# Patient Record
Sex: FEMALE | Race: OTHER | NOT HISPANIC OR LATINO | Employment: FULL TIME | URBAN - METROPOLITAN AREA
[De-identification: names, ages, dates, MRNs, and addresses within clinical notes are randomized per-mention and may not be internally consistent; named-entity substitution may affect disease eponyms.]

---

## 2017-02-03 ENCOUNTER — OFFICE VISIT (OUTPATIENT)
Dept: INTERNAL MEDICINE | Facility: CLINIC | Age: 57
End: 2017-02-03

## 2017-02-03 VITALS
SYSTOLIC BLOOD PRESSURE: 124 MMHG | DIASTOLIC BLOOD PRESSURE: 82 MMHG | BODY MASS INDEX: 38.41 KG/M2 | RESPIRATION RATE: 14 BRPM | WEIGHT: 239 LBS | HEIGHT: 66 IN

## 2017-02-03 DIAGNOSIS — I10 ESSENTIAL HYPERTENSION: Primary | ICD-10-CM

## 2017-02-03 PROCEDURE — 99212 OFFICE O/P EST SF 10 MIN: CPT | Performed by: INTERNAL MEDICINE

## 2017-02-03 RX ORDER — LISINOPRIL 10 MG/1
10 TABLET ORAL DAILY
Qty: 30 TABLET | Refills: 11 | Status: SHIPPED | OUTPATIENT
Start: 2017-02-03 | End: 2017-08-04 | Stop reason: SDUPTHER

## 2017-02-03 RX ORDER — LISINOPRIL 20 MG/1
20 TABLET ORAL DAILY
Qty: 30 TABLET | Refills: 11 | Status: SHIPPED | OUTPATIENT
Start: 2017-02-03 | End: 2017-08-04 | Stop reason: SDUPTHER

## 2017-02-03 NOTE — PROGRESS NOTES
"Chief Complaint   Patient presents with   • Hypertension     6 month follow up         Subjective   Breana Arreola is a 56 y.o. female     Hypertension   This is a chronic problem. The problem is controlled. Pertinent negatives include no blurred vision, chest pain, headaches, orthopnea, palpitations, peripheral edema, PND or shortness of breath. There are no associated agents to hypertension. Risk factors for coronary artery disease include obesity. Treatments tried: Current treatment ACE inhibitor. The current treatment provides moderate improvement. Compliance problems include diet and exercise.  There is no history of CAD/MI or CVA.   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  She has not yet received her results.    The following portions of the patient's history were reviewed and updated as appropriate: allergies, current medications, past social history, problem list, past surgical history    Review of Systems   Constitutional: Negative for activity change and appetite change.   HENT: Negative for nosebleeds.    Eyes: Negative for blurred vision and visual disturbance.   Respiratory: Negative for cough and shortness of breath.    Cardiovascular: Negative for chest pain, palpitations, orthopnea, leg swelling and PND.   Neurological: Negative for headaches.   Psychiatric/Behavioral: Negative for sleep disturbance.       Visit Vitals   • /82 (BP Location: Right arm, Patient Position: Sitting, Cuff Size: Adult)   • Resp 14   • Ht 66\" (167.6 cm)   • Wt 239 lb (108 kg)   • BMI 38.58 kg/m2        Objective   Physical Exam   Constitutional: She is oriented to person, place, and time. She appears well-developed and well-nourished. No distress.   Neck: Normal carotid pulses present. Carotid bruit is not present.   Cardiovascular: Normal rate, regular rhythm, S1 normal, S2 normal and intact distal pulses.  Exam reveals no gallop and no friction rub.    No murmur heard.  Pulses:       " Carotid pulses are 2+ on the right side, and 2+ on the left side.  Pulmonary/Chest: Effort normal and breath sounds normal. No respiratory distress. She has no wheezes. She has no rales. Chest wall is not dull to percussion.   Musculoskeletal: She exhibits no edema.   Neurological: She is alert and oriented to person, place, and time.   Skin: Skin is warm and dry.   Nursing note and vitals reviewed.      Assessment/Plan   Problem List Items Addressed This Visit        Cardiovascular and Mediastinum    Hypertension - Primary    Relevant Medications    lisinopril (PRINIVIL,ZESTRIL) 10 MG tablet    lisinopril (PRINIVIL,ZESTRIL) 20 MG tablet

## 2017-08-04 ENCOUNTER — OFFICE VISIT (OUTPATIENT)
Dept: INTERNAL MEDICINE | Facility: CLINIC | Age: 57
End: 2017-08-04

## 2017-08-04 VITALS
OXYGEN SATURATION: 92 % | HEIGHT: 66 IN | BODY MASS INDEX: 39.86 KG/M2 | WEIGHT: 248 LBS | DIASTOLIC BLOOD PRESSURE: 82 MMHG | SYSTOLIC BLOOD PRESSURE: 120 MMHG | HEART RATE: 94 BPM

## 2017-08-04 DIAGNOSIS — I10 ESSENTIAL HYPERTENSION: Primary | ICD-10-CM

## 2017-08-04 PROBLEM — R79.9 ABNORMAL BLOOD CHEMISTRY: Status: ACTIVE | Noted: 2017-08-04

## 2017-08-04 PROBLEM — H91.90 DIFFICULTY HEARING: Status: ACTIVE | Noted: 2017-08-04

## 2017-08-04 PROBLEM — E66.9 ADIPOSITY: Status: ACTIVE | Noted: 2017-08-04

## 2017-08-04 PROCEDURE — 99212 OFFICE O/P EST SF 10 MIN: CPT | Performed by: INTERNAL MEDICINE

## 2017-08-04 RX ORDER — LISINOPRIL 20 MG/1
20 TABLET ORAL DAILY
Qty: 30 TABLET | Refills: 11 | Status: SHIPPED | OUTPATIENT
Start: 2017-08-04 | End: 2018-04-18 | Stop reason: SDUPTHER

## 2017-08-04 RX ORDER — LISINOPRIL 10 MG/1
10 TABLET ORAL DAILY
Qty: 30 TABLET | Refills: 11 | Status: SHIPPED | OUTPATIENT
Start: 2017-08-04 | End: 2018-04-18 | Stop reason: SDUPTHER

## 2017-08-04 NOTE — PROGRESS NOTES
"Chief Complaint   Patient presents with   • Hypertension   • Vitamin D Deficiency        Subjective   Breana Arreoal is a 56 y.o. female     Hypertension   This is a chronic problem. The problem is controlled. Pertinent negatives include no chest pain, headaches, orthopnea, palpitations, peripheral edema, PND or shortness of breath. There are no associated agents to hypertension. Risk factors for coronary artery disease include obesity. Past treatments include ACE inhibitors (This is current treatment). The current treatment provides significant improvement. There are no compliance problems (she has started using stairs at  work for exercise, also walkiing).  There is no history of CAD/MI or CVA.   :     The following portions of the patient's history were reviewed and updated as appropriate: allergies, current medications, past social history, problem list, past surgical history    Review of Systems   Constitutional: Negative for activity change and appetite change.   HENT: Negative for nosebleeds.    Eyes: Negative for visual disturbance.   Respiratory: Negative for cough and shortness of breath.    Cardiovascular: Negative for chest pain, palpitations, orthopnea, leg swelling and PND.   Neurological: Negative for headaches.   Psychiatric/Behavioral: Negative for sleep disturbance.           Objective     /82 (BP Location: Left arm, Patient Position: Sitting, Cuff Size: Large Adult)  Pulse 94  Ht 66\" (167.6 cm)  Wt 248 lb (112 kg)  SpO2 92%  BMI 40.03 kg/m2     Physical Exam   Constitutional: She is oriented to person, place, and time. She appears well-developed and well-nourished. No distress.   Neck: Normal carotid pulses present. Carotid bruit is not present.   Cardiovascular: Normal rate, regular rhythm, S1 normal, S2 normal and intact distal pulses.  Exam reveals no gallop and no friction rub.    No murmur heard.  Pulses:       Carotid pulses are 2+ on the right side, and 2+ on the left " side.  Pulmonary/Chest: Effort normal and breath sounds normal. No respiratory distress. She has no wheezes. She has no rhonchi. She has no rales. Chest wall is not dull to percussion.   Musculoskeletal: She exhibits no edema.   Neurological: She is alert and oriented to person, place, and time.   Skin: Skin is warm and dry.   Nursing note and vitals reviewed.      Assessment/Plan   Problem List Items Addressed This Visit        Cardiovascular and Mediastinum    Hypertension - Primary    Relevant Medications    lisinopril (PRINIVIL,ZESTRIL) 10 MG tablet    lisinopril (PRINIVIL,ZESTRIL) 20 MG tablet        She states she has had lab work done recently through work.  She will have results forwarded here.

## 2017-10-02 ENCOUNTER — OFFICE VISIT (OUTPATIENT)
Dept: INTERNAL MEDICINE | Facility: CLINIC | Age: 57
End: 2017-10-02

## 2017-10-02 VITALS
TEMPERATURE: 97.9 F | BODY MASS INDEX: 40.66 KG/M2 | HEIGHT: 66 IN | WEIGHT: 253 LBS | DIASTOLIC BLOOD PRESSURE: 84 MMHG | SYSTOLIC BLOOD PRESSURE: 140 MMHG

## 2017-10-02 DIAGNOSIS — J06.9 ACUTE URI: Primary | ICD-10-CM

## 2017-10-02 PROCEDURE — 99213 OFFICE O/P EST LOW 20 MIN: CPT | Performed by: NURSE PRACTITIONER

## 2017-10-02 RX ORDER — AZITHROMYCIN 250 MG/1
TABLET, FILM COATED ORAL
Qty: 6 TABLET | Refills: 0 | Status: SHIPPED | OUTPATIENT
Start: 2017-10-02 | End: 2018-04-18

## 2017-10-02 RX ORDER — GUAIFENESIN 600 MG/1
1200 TABLET, EXTENDED RELEASE ORAL 2 TIMES DAILY
Qty: 14 TABLET
Start: 2017-10-02 | End: 2017-10-09

## 2017-10-02 RX ORDER — LORATADINE 10 MG/1
10 TABLET ORAL DAILY
Qty: 10 TABLET
Start: 2017-10-02 | End: 2017-10-12

## 2017-10-02 NOTE — PROGRESS NOTES
Subjective   Breana Arreola is a 57 y.o. female who presents due to respiratory symptoms.    URI    This is a new problem. The current episode started in the past 7 days. The problem has been gradually worsening. There has been no fever. Associated symptoms include congestion, coughing, ear pain (bilateral ear pressure), headaches, nausea, rhinorrhea, sinus pain, sneezing and a sore throat. Pertinent negatives include no abdominal pain, chest pain, diarrhea, dysuria, vomiting or wheezing. She has tried decongestant, antihistamine and NSAIDs (Dayquil, Nyquil, Aleve) for the symptoms. The treatment provided mild relief.        The following portions of the patient's history were reviewed and updated as appropriate: allergies, current medications, past social history and problem list.    Past Medical History:   Diagnosis Date   • Elevated fasting glucose    • Hypertension    • Vitamin D deficiency          Current Outpatient Prescriptions:   •  Cholecalciferol (VITAMIN D3) 2000 UNITS tablet, Take  by mouth daily., Disp: , Rfl:   •  Coenzyme Q10-Vitamin E 100-300 MG-UNIT wafer, Take  by mouth., Disp: , Rfl:   •  lisinopril (PRINIVIL,ZESTRIL) 10 MG tablet, Take 1 tablet by mouth Daily., Disp: 30 tablet, Rfl: 11  •  lisinopril (PRINIVIL,ZESTRIL) 20 MG tablet, Take 1 tablet by mouth Daily., Disp: 30 tablet, Rfl: 11  •  Magnesium 100 MG tablet, Take  by mouth., Disp: , Rfl:   •  Multiple Vitamins-Minerals (ALIVE ONCE DAILY WOMENS 50+) tablet, Take  by mouth., Disp: , Rfl:   •  Omega-3 Fatty Acids (FISH OIL) 1000 MG capsule capsule, Take 2 capsules by mouth daily., Disp: , Rfl:   •  Probiotic capsule, Take  by mouth daily., Disp: , Rfl:   •  TURMERIC PO, Take 1 capsule by mouth Daily., Disp: , Rfl:   •  vitamin B-12 (CYANOCOBALAMIN) 100 MCG tablet, Take 50 mcg by mouth daily., Disp: , Rfl:   •  azithromycin (ZITHROMAX Z-CHARLIE) 250 MG tablet, Take 2 tablets the first day, then 1 tablet daily for 4 days., Disp: 6 tablet, Rfl:  "0  •  guaiFENesin (MUCINEX) 600 MG 12 hr tablet, Take 2 tablets by mouth 2 (Two) Times a Day for 7 days., Disp: 14 tablet, Rfl:   •  loratadine (CLARITIN) 10 MG tablet, Take 1 tablet by mouth Daily for 10 days., Disp: 10 tablet, Rfl:     Allergies   Allergen Reactions   • No Known Drug Allergy        Review of Systems   Constitutional: Positive for fatigue. Negative for activity change, appetite change, chills, fever and unexpected weight change.   HENT: Positive for congestion, ear pain (bilateral ear pressure), postnasal drip, rhinorrhea, sinus pain, sinus pressure, sneezing and sore throat. Negative for drooling, facial swelling, hearing loss, mouth sores, nosebleeds, trouble swallowing and voice change.    Eyes: Negative for pain, discharge, itching and visual disturbance.   Respiratory: Positive for cough and chest tightness. Negative for choking, shortness of breath and wheezing.    Cardiovascular: Negative for chest pain and palpitations.   Gastrointestinal: Positive for nausea. Negative for abdominal pain, constipation, diarrhea and vomiting.   Endocrine: Negative for polyuria.   Genitourinary: Negative for dysuria and frequency.   Musculoskeletal: Negative for back pain and joint swelling.   Neurological: Positive for headaches.       Objective   Vitals:    10/02/17 1447   BP: 140/84   BP Location: Left arm   Patient Position: Sitting   Cuff Size: Adult   Temp: 97.9 °F (36.6 °C)   TempSrc: Oral   Weight: 253 lb (115 kg)   Height: 66\" (167.6 cm)     Physical Exam   Constitutional: She appears well-developed and well-nourished. She is cooperative. She does not have a sickly appearance. She does not appear ill.   HENT:   Head: Normocephalic.   Right Ear: Hearing and external ear normal. No drainage, swelling or tenderness. Tympanic membrane is bulging. Tympanic membrane is not erythematous. No middle ear effusion. No decreased hearing is noted.   Left Ear: Hearing and external ear normal. No drainage, swelling " or tenderness. Tympanic membrane is bulging. Tympanic membrane is not erythematous.  No middle ear effusion. No decreased hearing is noted.   Nose: Nose normal. No mucosal edema, rhinorrhea, sinus tenderness or nasal deformity. Right sinus exhibits no maxillary sinus tenderness and no frontal sinus tenderness. Left sinus exhibits no maxillary sinus tenderness and no frontal sinus tenderness.   Mouth/Throat: Mucous membranes are normal. Posterior oropharyngeal erythema present.   Eyes: Conjunctivae and lids are normal.   Neck: Trachea normal.   Cardiovascular: Regular rhythm, normal heart sounds and normal pulses.    No murmur heard.  Pulmonary/Chest: Breath sounds normal. No respiratory distress. She has no decreased breath sounds. She has no wheezes. She has no rhonchi. She has no rales.   Lymphadenopathy:     She has no cervical adenopathy.   Neurological: She is alert.   Skin: Skin is warm, dry and intact.   Nursing note and vitals reviewed.      Assessment/Plan   Breana was seen today for uri.    Diagnoses and all orders for this visit:    Acute URI  -     guaiFENesin (MUCINEX) 600 MG 12 hr tablet; Take 2 tablets by mouth 2 (Two) Times a Day for 7 days.  -     loratadine (CLARITIN) 10 MG tablet; Take 1 tablet by mouth Daily for 10 days.  -     azithromycin (ZITHROMAX Z-CHARLIE) 250 MG tablet; Take 2 tablets the first day, then 1 tablet daily for 4 days.

## 2018-04-18 ENCOUNTER — OFFICE VISIT (OUTPATIENT)
Dept: INTERNAL MEDICINE | Facility: CLINIC | Age: 58
End: 2018-04-18

## 2018-04-18 VITALS
HEIGHT: 66 IN | SYSTOLIC BLOOD PRESSURE: 122 MMHG | WEIGHT: 255 LBS | BODY MASS INDEX: 40.98 KG/M2 | DIASTOLIC BLOOD PRESSURE: 80 MMHG

## 2018-04-18 DIAGNOSIS — I10 ESSENTIAL HYPERTENSION: Primary | ICD-10-CM

## 2018-04-18 PROCEDURE — 99212 OFFICE O/P EST SF 10 MIN: CPT | Performed by: INTERNAL MEDICINE

## 2018-04-18 RX ORDER — LISINOPRIL 20 MG/1
20 TABLET ORAL DAILY
Qty: 30 TABLET | Refills: 11 | Status: SHIPPED | OUTPATIENT
Start: 2018-04-18 | End: 2018-10-22 | Stop reason: SDUPTHER

## 2018-04-18 RX ORDER — LISINOPRIL 10 MG/1
10 TABLET ORAL DAILY
Qty: 30 TABLET | Refills: 11 | Status: SHIPPED | OUTPATIENT
Start: 2018-04-18 | End: 2018-10-22 | Stop reason: SDUPTHER

## 2018-04-18 NOTE — PROGRESS NOTES
Chief Complaint   Patient presents with   • Hypertension     6 month follow up   • Vitamin D Deficiency       Subjective   Breana Arreola is a 57 y.o. female.     She continues to take lisinopril 20 mg +10 mg for a total daily dose of 30 mg.  She states it is less expensive to get separate prescriptions for the 20 mg and 10 mg doses rather than getting a single prescription for 30 mg.      Hypertension   This is a chronic problem. The problem is unchanged. The problem is controlled. Pertinent negatives include no blurred vision, chest pain, headaches, orthopnea, palpitations, peripheral edema, PND or shortness of breath. There are no associated agents to hypertension. Risk factors for coronary artery disease include obesity and sedentary lifestyle. Current antihypertension treatment includes ACE inhibitors. The current treatment provides moderate improvement. Compliance problems include diet and exercise.  There is no history of CAD/MI or CVA.          The following portions of the patient's history were reviewed and updated as appropriate: allergies, current medications, past family history, past medical history, past social history, past surgical history and problem list.    Review of Systems   Constitutional: Negative for appetite change.   HENT: Negative for nosebleeds.    Eyes: Negative for blurred vision.   Respiratory: Negative for shortness of breath.    Cardiovascular: Negative for chest pain, palpitations, orthopnea, leg swelling and PND.   Endocrine: Negative for polydipsia and polyuria.   Neurological: Negative for headaches.         Current Outpatient Prescriptions:   •  Garlic 1000 MG capsule, Take  by mouth., Disp: , Rfl:   •  Cholecalciferol (VITAMIN D3) 2000 UNITS tablet, Take  by mouth daily., Disp: , Rfl:   •  Coenzyme Q10-Vitamin E 100-300 MG-UNIT wafer, Take  by mouth., Disp: , Rfl:   •  lisinopril (PRINIVIL,ZESTRIL) 10 MG tablet, Take 1 tablet by mouth Daily., Disp: 30 tablet, Rfl: 11  •   "lisinopril (PRINIVIL,ZESTRIL) 20 MG tablet, Take 1 tablet by mouth Daily., Disp: 30 tablet, Rfl: 11  •  Magnesium 100 MG tablet, Take  by mouth., Disp: , Rfl:   •  Multiple Vitamins-Minerals (ALIVE ONCE DAILY WOMENS 50+) tablet, Take  by mouth., Disp: , Rfl:   •  Omega-3 Fatty Acids (FISH OIL) 1000 MG capsule capsule, Take 2 capsules by mouth daily., Disp: , Rfl:   •  Probiotic capsule, Take  by mouth daily., Disp: , Rfl:   •  TURMERIC PO, Take 1 capsule by mouth Daily., Disp: , Rfl:   •  vitamin B-12 (CYANOCOBALAMIN) 100 MCG tablet, Take 50 mcg by mouth daily., Disp: , Rfl:         Objective     /80   Ht 167.6 cm (66\")   Wt 116 kg (255 lb)   BMI 41.16 kg/m²     Physical Exam   Constitutional: She is oriented to person, place, and time. She appears well-developed and well-nourished. No distress.   Neck: Normal carotid pulses present. Carotid bruit is not present.   Cardiovascular: Regular rhythm, S1 normal and S2 normal.  Exam reveals no gallop and no friction rub.    No murmur heard.  Pulses:       Carotid pulses are 2+ on the right side, and 2+ on the left side.  Pulmonary/Chest: Effort normal and breath sounds normal. She has no wheezes. She has no rhonchi. She has no rales. Chest wall is not dull to percussion.   Musculoskeletal: She exhibits no edema.   Neurological: She is alert and oriented to person, place, and time.   Skin: Skin is warm and dry.   Nursing note and vitals reviewed.        Assessment/Plan   Breana was seen today for hypertension and vitamin d deficiency.    Diagnoses and all orders for this visit:    Essential hypertension    Other orders  -     lisinopril (PRINIVIL,ZESTRIL) 10 MG tablet; Take 1 tablet by mouth Daily.  -     lisinopril (PRINIVIL,ZESTRIL) 20 MG tablet; Take 1 tablet by mouth Daily.        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       "

## 2018-10-18 ENCOUNTER — OFFICE VISIT (OUTPATIENT)
Dept: INTERNAL MEDICINE | Facility: CLINIC | Age: 58
End: 2018-10-18

## 2018-10-18 VITALS
HEIGHT: 66 IN | SYSTOLIC BLOOD PRESSURE: 132 MMHG | BODY MASS INDEX: 39.53 KG/M2 | WEIGHT: 246 LBS | DIASTOLIC BLOOD PRESSURE: 76 MMHG

## 2018-10-18 DIAGNOSIS — I10 ESSENTIAL HYPERTENSION: Primary | ICD-10-CM

## 2018-10-18 PROCEDURE — 99213 OFFICE O/P EST LOW 20 MIN: CPT | Performed by: INTERNAL MEDICINE

## 2018-10-18 RX ORDER — COLLAGEN, HYDROLYSATE (BOVINE) 100 %
1 POWDER (GRAM) MISCELLANEOUS DAILY
COMMUNITY
End: 2022-09-13

## 2018-10-18 NOTE — PROGRESS NOTES
Chief Complaint   Patient presents with   • Hypertension     6 month follow up       Subjective   Breana Arreola is a 58 y.o. female.     Hypertension   This is a chronic problem. The problem is controlled. Pertinent negatives include no blurred vision, chest pain, headaches, orthopnea, palpitations, peripheral edema, PND or shortness of breath. There are no associated agents to hypertension. Current antihypertension treatment includes lifestyle changes and ACE inhibitors. The current treatment provides moderate improvement. There are no compliance problems.  There is no history of CAD/MI or CVA.        The following portions of the patient's history were reviewed and updated as appropriate: allergies, current medications, past family history, past medical history, past social history, past surgical history and problem list.    Review of Systems   Constitutional: Negative for appetite change.   HENT: Negative for nosebleeds.    Eyes: Negative for blurred vision and double vision.   Respiratory: Negative for cough and shortness of breath.    Cardiovascular: Negative for chest pain, palpitations, orthopnea, leg swelling and PND.   Neurological: Negative for headache.         Current Outpatient Prescriptions:   •  Cholecalciferol (VITAMIN D3) 2000 UNITS tablet, Take  by mouth daily., Disp: , Rfl:   •  Coenzyme Q10-Vitamin E 100-300 MG-UNIT wafer, Take  by mouth., Disp: , Rfl:   •  Collagen Hydrolysate powder, , Disp: , Rfl:   •  Garlic 1000 MG capsule, Take  by mouth., Disp: , Rfl:   •  lisinopril (PRINIVIL,ZESTRIL) 10 MG tablet, Take 1 tablet by mouth Daily., Disp: 30 tablet, Rfl: 11  •  lisinopril (PRINIVIL,ZESTRIL) 20 MG tablet, Take 1 tablet by mouth Daily., Disp: 30 tablet, Rfl: 11  •  Magnesium 100 MG tablet, Take  by mouth., Disp: , Rfl:   •  Multiple Vitamins-Minerals (ALIVE ONCE DAILY WOMENS 50+) tablet, Take  by mouth., Disp: , Rfl:   •  Omega-3 Fatty Acids (FISH OIL) 1000 MG capsule capsule, Take 2 capsules  "by mouth daily., Disp: , Rfl:   •  Probiotic capsule, Take  by mouth daily., Disp: , Rfl:   •  TURMERIC PO, Take 1 capsule by mouth Daily., Disp: , Rfl:   •  vitamin B-12 (CYANOCOBALAMIN) 100 MCG tablet, Take 50 mcg by mouth daily., Disp: , Rfl:         Objective     /76   Ht 167.6 cm (66\")   Wt 112 kg (246 lb)   BMI 39.71 kg/m²     Physical Exam   Constitutional: She is oriented to person, place, and time. She appears well-developed and well-nourished. No distress.   Neck: Normal carotid pulses present. Carotid bruit is not present.   Cardiovascular: Regular rhythm, S1 normal and S2 normal.  Exam reveals no gallop and no friction rub.    No murmur heard.  Pulses:       Carotid pulses are 2+ on the right side, and 2+ on the left side.  Pulmonary/Chest: Effort normal and breath sounds normal. She has no wheezes. She has no rhonchi. She has no rales. Chest wall is not dull to percussion.   Musculoskeletal: She exhibits no edema.   Neurological: She is alert and oriented to person, place, and time.   Skin: Skin is warm and dry.   Nursing note and vitals reviewed.        Assessment/Plan   Breana was seen today for hypertension.    Diagnoses and all orders for this visit:    Essential hypertension      She brings in copies of lab work done recently for complete physical exam she gets through work.  Triglycerides 88, total cholesterol 195, HDL cholesterol 60, .  Competence of metabolic panel showed glucose 96.  Normal electrolytes, kidney tests and liver tests.  Alkaline phosphatase was mildly elevated at 125, stable compared to previous levels.  Hemoglobin A1c was 5.7.  Hemoglobin 13.2, hematocrit 40.6.  Urinalysis normal.    She will get the flu vaccine at work.         "

## 2018-10-22 ENCOUNTER — TELEPHONE (OUTPATIENT)
Dept: INTERNAL MEDICINE | Facility: CLINIC | Age: 58
End: 2018-10-22

## 2018-10-22 RX ORDER — LISINOPRIL 20 MG/1
20 TABLET ORAL DAILY
Qty: 30 TABLET | Refills: 3 | Status: SHIPPED | OUTPATIENT
Start: 2018-10-22 | End: 2019-11-06

## 2018-10-22 RX ORDER — LISINOPRIL 10 MG/1
10 TABLET ORAL DAILY
Qty: 30 TABLET | Refills: 3 | Status: SHIPPED | OUTPATIENT
Start: 2018-10-22 | End: 2019-11-06

## 2018-10-22 NOTE — TELEPHONE ENCOUNTER
----- Message from Sharona Drew sent at 10/22/2018  9:44 AM EDT -----  Contact: Patient   Patient called.  She saw Dr. Rao last week, and states her BP meds failed to get sent to the pharmacy.  Please advise.       lisinopril (PRINIVIL,ZESTRIL) 10 MG tablet    lisinopril (PRINIVIL,ZESTRIL) 20 MG tablet     Patient:  [phone]    [place] [id number] [address] [facility] [address]  - [phone]  - [phone] FX

## 2019-04-26 ENCOUNTER — OFFICE VISIT (OUTPATIENT)
Dept: INTERNAL MEDICINE | Facility: CLINIC | Age: 59
End: 2019-04-26

## 2019-04-26 VITALS
DIASTOLIC BLOOD PRESSURE: 84 MMHG | SYSTOLIC BLOOD PRESSURE: 126 MMHG | HEIGHT: 66 IN | BODY MASS INDEX: 38.73 KG/M2 | WEIGHT: 241 LBS

## 2019-04-26 DIAGNOSIS — I10 ESSENTIAL HYPERTENSION: Primary | ICD-10-CM

## 2019-04-26 PROCEDURE — 99212 OFFICE O/P EST SF 10 MIN: CPT | Performed by: INTERNAL MEDICINE

## 2019-04-26 NOTE — PROGRESS NOTES
Chief Complaint   Patient presents with   • Hypertension       Subjective   Breana Arreola is a 58 y.o. female.     Hypertension   This is a chronic problem. The problem is controlled. Pertinent negatives include no blurred vision, chest pain, orthopnea, palpitations, peripheral edema, PND or shortness of breath. Current antihypertension treatment includes ACE inhibitors and lifestyle changes. The current treatment provides moderate improvement. Compliance problems include diet and exercise.         The following portions of the patient's history were reviewed and updated as appropriate: allergies, current medications, past family history, past medical history, past social history, past surgical history and problem list.    Review of Systems   Constitutional: Negative for appetite change.   HENT: Negative for nosebleeds.    Eyes: Negative for blurred vision and double vision.   Respiratory: Negative for cough and shortness of breath.    Cardiovascular: Negative for chest pain, palpitations, orthopnea, leg swelling and PND.         Current Outpatient Medications:   •  Cholecalciferol (VITAMIN D3) 2000 UNITS tablet, Take  by mouth daily., Disp: , Rfl:   •  Coenzyme Q10-Vitamin E 100-300 MG-UNIT wafer, Take  by mouth., Disp: , Rfl:   •  Collagen Hydrolysate powder, , Disp: , Rfl:   •  Garlic 1000 MG capsule, Take  by mouth., Disp: , Rfl:   •  lisinopril (PRINIVIL,ZESTRIL) 10 MG tablet, Take 1 tablet by mouth Daily., Disp: 30 tablet, Rfl: 3  •  lisinopril (PRINIVIL,ZESTRIL) 20 MG tablet, Take 1 tablet by mouth Daily., Disp: 30 tablet, Rfl: 3  •  Magnesium 100 MG tablet, Take  by mouth., Disp: , Rfl:   •  Multiple Vitamins-Minerals (ALIVE ONCE DAILY WOMENS 50+) tablet, Take  by mouth., Disp: , Rfl:   •  Omega-3 Fatty Acids (FISH OIL) 1000 MG capsule capsule, Take 2 capsules by mouth daily., Disp: , Rfl:   •  Probiotic capsule, Take  by mouth daily., Disp: , Rfl:   •  TURMERIC PO, Take 1 capsule by mouth Daily., Disp: ,  "Rfl:   •  vitamin B-12 (CYANOCOBALAMIN) 100 MCG tablet, Take 50 mcg by mouth daily., Disp: , Rfl:         Objective     /84   Ht 167.6 cm (66\")   Wt 109 kg (241 lb)   BMI 38.90 kg/m²     Physical Exam   Constitutional: She is oriented to person, place, and time. She appears well-developed and well-nourished. No distress.   Neck: Normal carotid pulses present. Carotid bruit is not present.   Cardiovascular: Regular rhythm, S1 normal and S2 normal. Exam reveals no gallop and no friction rub.   No murmur heard.  Pulses:       Carotid pulses are 2+ on the right side, and 2+ on the left side.  Pulmonary/Chest: Effort normal and breath sounds normal. She has no wheezes. She has no rhonchi. She has no rales. Chest wall is not dull to percussion.   Musculoskeletal: She exhibits no edema.   Neurological: She is alert and oriented to person, place, and time.   Skin: Skin is warm and dry.   Nursing note and vitals reviewed.        Assessment/Plan   Breana was seen today for hypertension.    Diagnoses and all orders for this visit:    Essential hypertension               "

## 2019-05-20 ENCOUNTER — TELEPHONE (OUTPATIENT)
Dept: INTERNAL MEDICINE | Facility: CLINIC | Age: 59
End: 2019-05-20

## 2019-05-20 NOTE — TELEPHONE ENCOUNTER
----- Message from Alena Harley sent at 5/20/2019 10:14 AM EDT -----  Contact: Bonita with [facility].  Calling to see if the RX for    lisinopril (PRINIVIL,ZESTRIL) 10 MG tablet    lisinopril (PRINIVIL,ZESTRIL) 20 MG tablet       Could be changed to one 30 mg tablet.    Caller# [phone]

## 2019-05-20 NOTE — TELEPHONE ENCOUNTER
Patient prefers lisinopril 10 + 20 rather than the one 30 mgm tablet.  She says it is less expensive.

## 2019-11-06 ENCOUNTER — OFFICE VISIT (OUTPATIENT)
Dept: INTERNAL MEDICINE | Facility: CLINIC | Age: 59
End: 2019-11-06

## 2019-11-06 VITALS
BODY MASS INDEX: 40.98 KG/M2 | HEIGHT: 66 IN | WEIGHT: 255 LBS | SYSTOLIC BLOOD PRESSURE: 108 MMHG | DIASTOLIC BLOOD PRESSURE: 80 MMHG

## 2019-11-06 DIAGNOSIS — I10 ESSENTIAL HYPERTENSION: Primary | ICD-10-CM

## 2019-11-06 PROCEDURE — 99212 OFFICE O/P EST SF 10 MIN: CPT | Performed by: INTERNAL MEDICINE

## 2019-11-06 RX ORDER — LISINOPRIL 30 MG/1
30 TABLET ORAL DAILY
Qty: 30 TABLET | Refills: 11 | Status: SHIPPED | OUTPATIENT
Start: 2019-11-06 | End: 2020-10-05

## 2019-11-06 NOTE — PROGRESS NOTES
Chief Complaint   Patient presents with   • Hypertension     follow up       Subjective   Breana Arreola is a 59 y.o. female.     History of Present Illness     She was scheduled for a physical exam today.  However, she has physical exams done through her employer.  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  She is going to be doing this in December.  She continues on lisinopril for hypertension.  She has been taking 1 tablet of 20 and 1 tablet of 10 to make up at full dose of 30 mg.  She has changed pharmacies.  She reports that her new pharmacy will cover the 30 mg tablet.  She has not have problems with chest pain, chest pressure, palpitations, lower extremity edema.  No dizziness.  She is trying to follow a prudent diet.  She does not exercise.    The following portions of the patient's history were reviewed and updated as appropriate: allergies, current medications, past family history, past medical history, past social history, past surgical history and problem list.    Review of Systems   Constitutional: Negative for appetite change.   HENT: Negative for nosebleeds.    Eyes: Negative for blurred vision and double vision.   Respiratory: Negative for cough and shortness of breath.    Cardiovascular: Negative for chest pain, palpitations and leg swelling.   Neurological: Negative for headache.         Current Outpatient Medications:   •  Cholecalciferol (VITAMIN D3) 2000 UNITS tablet, Take  by mouth daily., Disp: , Rfl:   •  Coenzyme Q10-Vitamin E 100-300 MG-UNIT wafer, Take  by mouth., Disp: , Rfl:   •  Collagen Hydrolysate powder, , Disp: , Rfl:   •  Magnesium 100 MG tablet, Take  by mouth., Disp: , Rfl:   •  Multiple Vitamins-Minerals (ALIVE ONCE DAILY WOMENS 50+) tablet, Take  by mouth., Disp: , Rfl:   •  Omega-3 Fatty Acids (FISH OIL) 1000 MG capsule capsule, Take 2 capsules by mouth daily., Disp: , Rfl:   •  Probiotic capsule, Take  by mouth daily., Disp: , Rfl:   •   "vitamin B-12 (CYANOCOBALAMIN) 100 MCG tablet, Take 50 mcg by mouth daily., Disp: , Rfl:   •  lisinopril (PRINIVIL,ZESTRIL) 30 MG tablet, Take 1 tablet by mouth Daily., Disp: 30 tablet, Rfl: 11        Objective     /80   Ht 167.6 cm (66\")   Wt 116 kg (255 lb)   BMI 41.16 kg/m²     Physical Exam   Constitutional: She is oriented to person, place, and time. She appears well-developed and well-nourished. No distress.   Neck: Normal carotid pulses present. Carotid bruit is not present.   Cardiovascular: Regular rhythm, S1 normal and S2 normal. Exam reveals no gallop and no friction rub.   No murmur heard.  Pulses:       Carotid pulses are 2+ on the right side, and 2+ on the left side.  Pulmonary/Chest: Effort normal and breath sounds normal. She has no wheezes. She has no rhonchi. She has no rales. Chest wall is not dull to percussion.   Musculoskeletal: She exhibits no edema.   Neurological: She is alert and oriented to person, place, and time.   Skin: Skin is warm and dry.   Nursing note and vitals reviewed.        Assessment/Plan   Breana was seen today for hypertension.    Diagnoses and all orders for this visit:    Essential hypertension    Other orders  -     lisinopril (PRINIVIL,ZESTRIL) 30 MG tablet; Take 1 tablet by mouth Daily.    Encouraged prudent diet and regular exercise.  She will continue lisinopril 30 mg daily.  She will check her records and see if she has received a Tdap vaccine.  Encouraged her to ask her employer in regard to the Shingrix vaccine.           "

## 2020-01-17 ENCOUNTER — OFFICE VISIT (OUTPATIENT)
Dept: INTERNAL MEDICINE | Facility: CLINIC | Age: 60
End: 2020-01-17

## 2020-01-17 VITALS
OXYGEN SATURATION: 98 % | HEART RATE: 102 BPM | SYSTOLIC BLOOD PRESSURE: 120 MMHG | DIASTOLIC BLOOD PRESSURE: 84 MMHG | WEIGHT: 255 LBS | BODY MASS INDEX: 41.16 KG/M2

## 2020-01-17 DIAGNOSIS — R29.898 WEAKNESS OF RIGHT LOWER EXTREMITY: Primary | ICD-10-CM

## 2020-01-17 DIAGNOSIS — M54.41 ACUTE MIDLINE LOW BACK PAIN WITH RIGHT-SIDED SCIATICA: ICD-10-CM

## 2020-01-17 PROCEDURE — 99213 OFFICE O/P EST LOW 20 MIN: CPT | Performed by: NURSE PRACTITIONER

## 2020-01-17 RX ORDER — METHYLPREDNISOLONE 4 MG/1
TABLET ORAL
Qty: 21 TABLET | Refills: 0 | Status: SHIPPED | OUTPATIENT
Start: 2020-01-17 | End: 2021-12-13

## 2020-01-17 NOTE — PROGRESS NOTES
Subjective     Breana Arreola is a 59 y.o. female.         She presents for shooting pain, weakness, and numbness to E x 2 days. She also reports mild lumbar pain that is described as pinching. This morning she was outside going back into the house and stepping up into the house when her R leg gave out and she fell onto the concrete on her head. She denies N/V and has a minimal h/a. Normal cognition. She does have a small abrasion to the occipital region of her head.    Leg Pain    There was no injury mechanism. The pain is mild. The pain has been constant since onset. Associated symptoms include muscle weakness and numbness. Pertinent negatives include no inability to bear weight, loss of motion, loss of sensation or tingling. The symptoms are aggravated by movement and weight bearing. She has tried NSAIDs for the symptoms. The treatment provided no relief.   Head Injury    The incident occurred 3 to 6 hours ago. The injury mechanism was a fall. There was no loss of consciousness. The volume of blood lost was minimal. The quality of the pain is described as aching. The pain is mild. The pain has been fluctuating since the injury. Associated symptoms include headaches, numbness and weakness. Pertinent negatives include no blurred vision, disorientation or memory loss. She has tried NSAIDs for the symptoms. The treatment provided mild relief.        The following portions of the patient's history were reviewed and updated as appropriate: allergies, current medications, past social history and problem list.    Review of Systems   Constitutional: Negative for fatigue and fever.   Eyes: Negative for blurred vision, photophobia and visual disturbance.   Respiratory: Negative for shortness of breath.    Cardiovascular: Negative for chest pain.   Musculoskeletal: Positive for back pain and gait problem.   Skin: Positive for wound. Negative for color change.   Neurological: Positive for weakness, numbness and headaches.  Negative for dizziness, tingling and syncope.   Psychiatric/Behavioral: Negative for memory loss.       Objective     /84 (BP Location: Left arm, Patient Position: Sitting, Cuff Size: Large Adult)   Pulse 102   Wt 116 kg (255 lb)   SpO2 98%   BMI 41.16 kg/m²     Physical Exam   Constitutional: She appears well-developed and well-nourished.   HENT:   Head: Normocephalic and atraumatic.   Cardiovascular: Normal rate, regular rhythm and normal heart sounds.   No murmur heard.  Pulmonary/Chest: Effort normal and breath sounds normal. No respiratory distress.   Musculoskeletal: Normal range of motion.        Right upper leg: She exhibits no tenderness, no swelling and no edema.   4/5 strength to R hamstring and quad.  5/5 LLE  Numbness to R quad.  Gait is hindered, unstable.   Neurological: She is alert.   Skin: Skin is warm and dry.   Psychiatric: She has a normal mood and affect. Her behavior is normal. Judgment and thought content normal.   Vitals reviewed.      Assessment/Plan     Breana was seen today for leg pain and head injury.    Diagnoses and all orders for this visit:    Weakness of right lower extremity  -     MRI Lumbar Spine Without Contrast; Future    Acute midline low back pain with right-sided sciatica  -     methylPREDNISolone (MEDROL, CHARLIE,) 4 MG tablet; Take as directed on package instructions.  -     Ambulatory Referral to Physical Therapy Evaluate and treat    She will hold NSAIDs while taking steroids.    Continue heat.    Use caution when ambulating.    Pt is leaving for cruise with  (who accompanies her today) in 4 weeks.    F/u TBD.

## 2020-01-23 DIAGNOSIS — R29.898 WEAKNESS OF RIGHT LOWER EXTREMITY: ICD-10-CM

## 2020-01-24 ENCOUNTER — TELEPHONE (OUTPATIENT)
Dept: INTERNAL MEDICINE | Facility: CLINIC | Age: 60
End: 2020-01-24

## 2020-01-24 ENCOUNTER — HOSPITAL ENCOUNTER (OUTPATIENT)
Dept: PHYSICAL THERAPY | Facility: HOSPITAL | Age: 60
Setting detail: THERAPIES SERIES
Discharge: HOME | End: 2020-01-24

## 2020-01-24 DIAGNOSIS — M54.50 ACUTE RIGHT-SIDED LOW BACK PAIN, UNSPECIFIED WHETHER SCIATICA PRESENT: Primary | ICD-10-CM

## 2020-01-24 PROCEDURE — 97530 THERAPEUTIC ACTIVITIES: CPT | Performed by: PHYSICAL THERAPIST

## 2020-01-24 PROCEDURE — 97162 PT EVAL MOD COMPLEX 30 MIN: CPT | Performed by: PHYSICAL THERAPIST

## 2020-01-24 NOTE — TELEPHONE ENCOUNTER
----- Message from HUGO Velasco sent at 1/23/2020  9:03 AM EST -----  Please call and let her her MRI showed DDD, facet arthritis, disc herniation to L1-L2 more on the right side (which is what I think is causing her RLE sx), and bulging disc to L4-L5. I do still believe PT is the best approach. If problems persist we can send her to spine specialists.

## 2020-01-24 NOTE — THERAPY EVALUATION
Outpatient Physical Therapy Ortho Initial Evaluation  [facility]     Patient Name: Breana Arreola  : 1960  MRN: [id number]  Today's Date: 2020      Visit Date: 2020    Patient Active Problem List   Diagnosis   • Hypertension   • Elevated fasting glucose   • Vitamin D deficiency   • Adiposity   • Difficulty hearing   • Abnormal blood chemistry        Past Medical History:   Diagnosis Date   • Elevated fasting glucose    • Hypertension    • Vitamin D deficiency         Past Surgical History:   Procedure Laterality Date   • FOOT SURGERY Left     At age 9 for pronated foot   • MOHS SURGERY      Basal cell carcinoma excision, nose       Visit Dx:     ICD-10-CM ICD-9-CM   1. Acute right-sided low back pain, unspecified whether sciatica present M54.5 724.2         Patient History     Row Name 20 1100             History    Chief Complaint  Difficulty Walking;Difficulty with daily activities;Pain  -GJ      Type of Pain  Back pain;Lower Extremity / Leg R  -GJ      Date Current Problem(s) Began  20  -GJ      Brief Description of Current Complaint  Ms. Arreola is a 60 y/o female. She reports waking up 8 days ago (2020) with severe R sided LBP/RLE pain to the ankle with numbness in same pattern.  She went to work that day, sitting mostly, and had to leave early secondary to pain.  The next day, she reports falling (losing balance) when stepping on her porch, hitting her head.  She was started on oral steroids and finished yesterday.  She reports improving pain/decreasing numbness of her RLE.  She denies changes in bowel/bladder, denies saddle anesthesia.  She is to leave in 3 weeks for a cruise.  She is afraid of her leg giving way on her and afraid of performing stairs.  Initially she had a hard time using the break in her car, so hasn't driven since.  Any prolonged position seems to aggravate her symptoms.  She bought a cane secondary to concerns re: mobility, however not using it  today.  She has pre-morbid ankle issues bilaterally, and reports LLD (L leg long secondary to surgery to ankle previously).  MRI performed, however unable to achieve access to images/report.    -GJ      Previous treatment for THIS PROBLEM  Medication steroid dose pack, finished yesterda  -GJ      Patient/Caregiver Goals  Relieve pain;Return to prior level of function;Improve mobility;Know what to do to help the symptoms  -GJ      Occupation/sports/leisure activities  computer work, going on cruise in 3 weeks  -GJ      What clinical tests have you had for this problem?  MRI  -GJ      Results of Clinical Tests  unsure, unable to access  -GJ      Are you or can you be pregnant  No  -GJ         Pain     Pain Location  Back;Leg  -GJ      What Performance Factors Make the Current Problem(s) WORSE?  any prolonged positioning  -GJ         Fall Risk Assessment    Any falls in the past year:  Yes  -GJ      Number of falls reported in the last 12 months  1  -GJ      Factors that contributed to the fall:  Tripped;Lost balance  -GJ      Does patient have a fear of falling  Yes (comment)  -GJ         Services    Prior Rehab/Home Health Experiences  --  -GJ         Daily Activities    Primary Language  English  -GJ      Are you able to read  Yes  -GJ      Are you able to write  Yes  -GJ      How does patient learn best?  Listening;Reading;Demonstration  -GJ      Teaching needs identified  Home Exercise Program;Management of Condition;Falls Prevention  -GJ      Patient is concerned about/has problems with  Climbing Stairs;Difficulty with self care (i.e. bathing, dressing, toileting:;Flexibility;Performing home management (household chores, shopping, care of dependents);Performing job responsibilities/community activities (work, school,;Performing sports, recreation, and play activities;Sitting;Standing;Transfers (getting out of a chair, bed);Walking  -GJ      Does patient have problems with the following?  Anxiety  -GJ       Barriers to learning  None  -GJ      Pt Participated in POC and Goals  Yes  -GJ         Safety    Are you being hurt, hit, or frightened by anyone at home or in your life?  No  -GJ      Are you being neglected by a caregiver  No  -GJ        User Key  (r) = Recorded By, (t) = Taken By, (c) = Cosigned By    Initials Name Provider Type    Rashawn Brock, PT Physical Therapist          PT Ortho     Row Name 01/24/20 0800       Posture/Observations    Alignment Options  Pes Planus;Foot pronation  -GJ    Foot pronation  Right:;Severe;Left:;Moderate  -GJ    Pes Planus  Right:;Severe;Left:;Moderate  -GJ    Observations  Ecchymosis/bruising R ankle, anterio/medial  -GJ       Quarter Clearing    Quarter Clearing  Lower Quarter Clearing  -GJ       DTR- Lower Quarter Clearing    Patellar tendon (L2-4)  Right:;0- No response;Left:;2- Normal response  -GJ    Achilles tendon (S1-2)  Bilateral:;1- Minimal response  -GJ       Neural Tension Signs- Lower Quarter Clearing    Slump  Bilateral:;Negative  -GJ    SLR  Bilateral:;Negative  -GJ    Prone knee flexion  Bilateral:;Negative  -GJ       Myotomal Screen- Lower Quarter Clearing    Hip flexion (L2)  Bilateral:;4 (Good)  -GJ    Knee extension (L3)  Bilateral:;4+ (Good +)  -GJ    Ankle DF (L4)  Bilateral:;4+ (Good +)  -GJ    Great toe extension (L5)  Bilateral:;4+ (Good +)  -GJ    Ankle PF (S1)  Bilateral:;3+ (Fair +)  -GJ    Knee flexion (S2)  Bilateral:;4+ (Good +)  -GJ       Lumbar ROM Screen- Lower Quarter Clearing    Lumbar Flexion  Impaired impaired by 25%  -GJ    Lumbar Extension  Normal  -GJ    Lumbar Rotation  Normal  -GJ       SI/Hip Screen- Lower Quarter Clearing    Shelley's/Azael's test  Left:;Positive;Right:;Negative for hip pathology  -GJ    Posterior thigh sheer  Bilateral:;Negative  -GJ       Special Tests/Palpation    Special Tests/Palpation  Lumbar/SI;Hip  -GJ       Lumbar/SI Special Tests    S[place]ral Spring Test (SI Dysfunction)  -- painful,   -GJ        Lumbosacral Palpation    Lumbosacral Palpation?  Yes  -GJ    Piriformis  Bilateral:;Tender hypersensitive  -GJ    Erector Spinae (Paraspinals)  Bilateral:;Guarded/taut  -GJ       Hip/Thigh Palpation    Hip/Thigh Palpation?  Yes  -GJ    Gluteus Medius  Bilateral:;Tender hypersensitive  -GJ       Hip Special Tests    Ely’s test (rectus femoris tightness)  Bilateral:;Positive  -GJ    Hip scour test (labral vs hip pathology)  Left:;Positive  -GJ       General ROM    GENERAL ROM COMMENTS  significant restrictions in L hip PROM flexion, ER, IR, and AMBROSE position  -GJ       MMT (Manual Muscle Testing)    Rt Lower Ext  Rt Hip Extension;Rt Hip ABduction  -GJ    Lt Lower Ext  Lt Hip Extension;Lt Hip ABduction  -GJ       MMT Right Lower Ext    Rt Hip Extension MMT, Gross Movement  (4/5) good  -GJ    Rt Hip ABduction MMT, Gross Movement  (4/5) good  -GJ       MMT Left Lower Ext    Lt Hip Extension MMT, Gross Movement  (4/5) good  -GJ    Lt Hip ABduction MMT, Gross Movement  (4/5) good  -GJ       Flexibility    Flexibility Tested?  Lower Extremity  -GJ       Lower Extremity Flexibility    Hamstrings  Bilateral:;Moderately limited  -GJ    Hip Flexors  Bilateral:;Moderately limited  -GJ    Quadriceps  Bilateral:;Moderately limited  -GJ    Hip External Rotators  Bilateral:;Moderately limited  -GJ    Hip Internal Rotators  Bilateral:;Moderately limited  -GJ       Balance Skills Training    SLS  unable  -GJ      User Key  (r) = Recorded By, (t) = Taken By, (c) = Cosigned By    Initials Name Provider Type    Rashawn Brock, PT Physical Therapist                      Therapy Education  Education Details: discussed dx, px, poc, discussed anatomy of the spine  and physiology of healing, discussed realistic expectations and time frames for therapy.  Discussed safety and use of cane, educated on proper technique with cane (L hand, to facilitate reciprocal gait pattern), discussed red flag signs and what to do.    Given: HEP,  Symptoms/condition management, Pain management, Mobility training, Fall prevention and home safety, Posture/body mechanics  Program: New  How Provided: Verbal, Written, Demonstration  Provided to: Patient  Level of Understanding: Teach back education performed, Verbalized, Demonstrated     PT OP Goals     Row Name 01/24/20 0800          PT Short Term Goals    STG Date to Achieve  02/28/20  -GJ     STG 1  pt. to be I with initial HEP to facilitate self management of their condition  -GJ     STG 1 Progress  New  -GJ     STG 2  pt. to be educated in/verbalize understanding of the importance of posture/ergonomics in association with their condition to facilitate self management of their condition  -GJ     STG 2 Progress  New  -GJ     STG 3  pt to complete Oswestry score questionaire to facilitate appropriate progression of treatments  -GJ     STG 3 Progress  New  -GJ        Long Term Goals    LTG Date to Achieve  04/24/20  -GJ     LTG 1  pt. to be I with advanced HEP to facilitate self management of their condition  -GJ     LTG 1 Progress  New  -GJ     LTG 2  pt. to report an Oswestry score >/= 10% less then at initial evaluation to demonstrate decreased level of perceived disability  -GJ     LTG 2 Progress  New  -GJ     LTG 3  pt. to ascend/descends stairs with reciprocal pattern (</= 1 rails) to facilitate ease/safety of household/community mobility  -GJ     LTG 3 Progress  New  -GJ     LTG 4  pt to report >/= 50% improvement in her condition to facilitate ease/safety of going on cruise  -GJ     LTG 4 Progress  New  -GJ        Time Calculation    PT Goal Re-Cert Due Date  04/24/20  -GJ       User Key  (r) = Recorded By, (t) = Taken By, (c) = Cosigned By    Initials Name Provider Type    Rashawn Brock, PT Physical Therapist          PT Assessment/Plan     Row Name 01/24/20 1122          PT Assessment    Functional Limitations  Impaired gait;Limitations in community activities;Performance in leisure  activities;Performance in work activities;Limitation in home management;Decreased safety during functional activities  -GJ     Impairments  Pain;Range of motion;Joint mobility;Impaired postural alignment;Impaired muscle endurance;Gait;Muscle strength;Posture;Poor body mechanics;Impaired flexibility;Impaired muscle length  -GJ     Assessment Comments  Ms. Arreola is a 58 y/o female. She reports waking up 8 days ago (1/16/2020) with severe R sided LBP/RLE pain to the ankle with numbness in same pattern.  She went to work that day, sitting mostly, and had to leave early secondary to pain.  The next day, she reports falling (losing balance) when stepping on her porch, hitting her head.  She was started on oral steroids and finished yesterday.  She reports improving pain/decreasing numbness of her RLE.  She denies changes in bowel/bladder, denies saddle anesthesia.  She is to leave in 3 weeks for a cruise.  She is afraid of her leg giving way on her and afraid of performing stairs.  Initially she had a hard time using the break in her car, so hasn't driven since.  Any prolonged position seems to aggravate her symptoms.  She bought a cane secondary to concerns re: mobility, however not using it today.  She has pre-morbid ankle issues bilaterally, and reports LLD (L leg long secondary to surgery to ankle previously).  MRI performed, however unable to achieve access to images/report.  Ms. Arreola presents, ambulating without AD, noted ER of RLE (pt reports this is her baseline).  She demonstrates R navicular drop.  She mobilizes cautiously.  She demonstrates absent R patellar reflex, diminished bilateral Achilles reflexes.  She demonstrates 4+ myotomal testing bilaterally/lower quarter, (-) neuro-tension testing.  She does demonstrate R lateral shift in stance, she attributes this to long L leg from previous surgery.  Of note, she demonstrates significant decrease in L hip PROM in all planes, likely OA. She also demonstrates  bruising of her R anterior/medial ankle, TTP just anterior to R medial malleolus (not on bone), likely sprain from fall, will continue to monitor.  She demonstrates shortened HS, hip flexor, hip rotator, tissues throughout, decreased core strength. Ms. Arreola demonstrates evolving s/s consistent with degenerative changes of her spine which limit her ability to participate in household, community, work, leisure activities.  Aggravating/personal factors affecting her care include but are not limited to significant fear avoidance behaviors, increased body habitus. She may benefit from skilled physical therapy to address the above impairments.    -GJ     Please refer to paper survey for additional self-reported information  Yes  -GJ     Rehab Potential  Good  -GJ     Patient/caregiver participated in establishment of treatment plan and goals  Yes  -GJ     Patient would benefit from skilled therapy intervention  Yes  -GJ        PT Plan    PT Frequency  2x/week  -GJ     Predicted Duration of Therapy Intervention (Therapy Eval)  4-6 weeks   -GJ     Planned CPT's?  PT EVAL MOD COMPLELITY: 12366;PT RE-EVAL: 39392;PT THER ACT EA 15 MIN: 02886;PT THER PROC EA 15 MIN: 55375;PT MANUAL THERAPY EA 15 MIN: 48736;PT NEUROMUSC RE-EDUCATION EA 15 MIN: 44202;PT AQUATIC THERAPY EA 15 MIN: 65906;PT HOT OR COLD PACK TREAT MCARE;PT ELECTRICAL STIM UNATTEND: ;PT TRACTION LUMBAR: 04696  -GJ     PT Plan Comments  warm up on Nustep with UE's, LTR, HL hip abd/add, PPT, LAQ, resisted DF (R), HS curl, ? bridge.  HS stretch, piriformis stretch.  Modalities prn.    -GJ       User Key  (r) = Recorded By, (t) = Taken By, (c) = Cosigned By    Initials Name Provider Type    Rashawn Brock, PT Physical Therapist            OP Exercises     Row Name 01/24/20 0800             Total Minutes    74809 - PT Therapeutic Activity Minutes  12 education  -GJ        User Key  (r) = Recorded By, (t) = Taken By, (c) = Cosigned By    Initials Name Provider  Type    GJ Rashawn Jeffrey, PT Physical Therapist                        Outcome Measure Options: Modifed Owestry  Modified Oswestry  Modified Oswestry Score/Comments: incomplete form      Time Calculation:     Start Time: 0830  Stop Time: 0915  Time Calculation (min): 45 min     Therapy Charges for Today     Code Description Service Date Service Provider Modifiers Qty    88704447510 HC PT THERAPEUTIC ACT EA 15 MIN 1/24/2020 Rashawn Jeffrey, PT GP 1    61223948916 HC PT EVAL MOD COMPLEXITY 2 1/24/2020 Rashawn Jeffrey, PT GP 1          PT G-Codes  Outcome Measure Options: Modifed Owestry  Modified Oswestry Score/Comments: incomplete form         Rashawn Jeffrey, PT  1/24/2020

## 2020-01-24 NOTE — TELEPHONE ENCOUNTER
Patient called wanting to discuss her MRI results done last Tuesday. She can be reached at [phone].

## 2020-01-28 ENCOUNTER — HOSPITAL ENCOUNTER (OUTPATIENT)
Dept: PHYSICAL THERAPY | Facility: HOSPITAL | Age: 60
Setting detail: THERAPIES SERIES
Discharge: HOME | End: 2020-01-28

## 2020-01-28 DIAGNOSIS — M54.50 ACUTE RIGHT-SIDED LOW BACK PAIN, UNSPECIFIED WHETHER SCIATICA PRESENT: Primary | ICD-10-CM

## 2020-01-28 PROCEDURE — 97110 THERAPEUTIC EXERCISES: CPT | Performed by: PHYSICAL THERAPIST

## 2020-01-28 NOTE — THERAPY TREATMENT NOTE
Outpatient Physical Therapy Ortho Treatment Note  [facility]     Patient Name: Breana Arreola  : 1960  MRN: [id number]  Today's Date: 2020      Visit Date: 2020    Visit Dx:    ICD-10-CM ICD-9-CM   1. Acute right-sided low back pain, unspecified whether sciatica present M54.5 724.2       Patient Active Problem List   Diagnosis   • Hypertension   • Elevated fasting glucose   • Vitamin D deficiency   • Adiposity   • Difficulty hearing   • Abnormal blood chemistry        Past Medical History:   Diagnosis Date   • Elevated fasting glucose    • Hypertension    • Vitamin D deficiency         Past Surgical History:   Procedure Laterality Date   • FOOT SURGERY Left     At age 9 for pronated foot   • MOHS SURGERY      Basal cell carcinoma excision, nose                       PT Assessment/Plan     Row Name 20 0738          PT Assessment    Assessment Comments  Ms. Arreola returns for her first follow up session.  She reports improving condition in terms of pain.  She continues to ambulate with guarding, but much less guarding then at initial evaluation. We initiated gentle strengthening/mobility exercies todayy and issued for home.  She tolerated well without immediate adverese response. She is due to leave on a cruise in the next 2-3 weeks. Of note, she demonstrates significant loss of L hip ROM, likely secondary to OA.  She continues to be a good candidate for skilled physical therapy.   -        PT Plan    PT Plan Comments  assess response to initial HEP, progress strengthening of R>L LE as able.  work on   hip girdle strengthening, gait, stairs  -EDDIE       User Key  (r) = Recorded By, (t) = Taken By, (c) = Cosigned By    Initials Name Provider Type    Rashawn Brock, PT Physical Therapist            OP Exercises     Row Name 20 0701 20 07          Subjective Comments    Subjective Comments  --  I think I'm feeling better.  I am walking better, but I'm still cautious with  the stairs  -GJ        Subjective Pain    Subjective Pain Comment  --  pt reports pain is mild today,  -GJ        Total Minutes    68372 - PT Therapeutic Exercise Minutes  45  -GJ  --        Exercise 1    Exercise Name 1  --  Nustep, UE/LE  -GJ     Time 1  --  5 min  -GJ        Exercise 2    Exercise Name 2  --  standing HS curl, B  -GJ     Cueing 2  --  Verbal;Demo  -GJ     Reps 2  --  12  -GJ     Additional Comments  --  2#  -GJ        Exercise 3    Exercise Name 3  --  standing HS stretch, at step  -GJ     Cueing 3  --  Verbal;Demo  -GJ     Reps 3  --  3  -GJ     Time 3  --  20 s  -GJ        Exercise 4    Exercise Name 4  --  LAQ, B  -GJ     Cueing 4  --  Verbal;Demo  -GJ     Reps 4  --  12  -GJ     Additional Comments  --  3#  -GJ        Exercise 5    Exercise Name 5  --  resisted DF, R  -GJ     Cueing 5  --  Verbal;Demo  -GJ     Reps 5  --  15  -GJ     Additional Comments  --  RTB  -GJ        Exercise 6    Exercise Name 6  --  HL hip abd  -GJ     Cueing 6  --  Verbal;Demo  -GJ     Reps 6  --  12  -GJ     Additional Comments  --  RTB  -GJ        Exercise 7    Exercise Name 7  --  HL hip add  -GJ     Cueing 7  --  Verbal;Demo  -GJ     Reps 7  --  12  -GJ     Additional Comments  --  ball  -GJ        Exercise 8    Exercise Name 8  --  LTR  -GJ     Cueing 8  --  Verbal;Demo  -GJ     Reps 8  --  10  -GJ     Time 8  --  5 s  -GJ        Exercise 9    Exercise Name 9  --  piriformis stretch  -GJ     Cueing 9  --  Verbal;Demo  -GJ     Reps 9  --  3  -GJ     Time 9  --  20 s  -GJ     Additional Comments  --  R leg only , figure 4 position  -GJ        Exercise 10    Exercise Name 10  --  PPT  -GJ     Cueing 10  --  Verbal;Demo  -GJ     Reps 10  --  10  -GJ     Time 10  --  5 s  -GJ       User Key  (r) = Recorded By, (t) = Taken By, (c) = Cosigned By    Initials Name Provider Type    GJ Rashawn Jeffrey W, PT Physical Therapist                       PT OP Goals     Row Name 01/28/20 0700          PT Short Term Goals    STG  Date to Achieve  02/28/20  -GJ     STG 1  pt. to be I with initial HEP to facilitate self management of their condition  -GJ     STG 1 Progress  Ongoing  -GJ     STG 1 Progress Comments  initiated today  -GJ     STG 2  pt. to be educated in/verbalize understanding of the importance of posture/ergonomics in association with their condition to facilitate self management of their condition  -GJ     STG 2 Progress  Ongoing  -GJ     STG 2 Progress Comments  reviewed  -GJ     STG 3  pt to complete Oswestry score questionaire to facilitate appropriate progression of treatments  -GJ     STG 3 Progress  Ongoing  -GJ        Long Term Goals    LTG Date to Achieve  04/24/20  -GJ     LTG 1  pt. to be I with advanced HEP to facilitate self management of their condition  -GJ     LTG 1 Progress  Ongoing  -GJ     LTG 2  pt. to report an Oswestry score >/= 10% less then at initial evaluation to demonstrate decreased level of perceived disability  -GJ     LTG 2 Progress  Ongoing  -GJ     LTG 3  pt. to ascend/descends stairs with reciprocal pattern (</= 1 rails) to facilitate ease/safety of household/community mobility  -GJ     LTG 3 Progress  Ongoing  -GJ     LTG 4  pt to report >/= 50% improvement in her condition to facilitate ease/safety of going on cruise  -GJ     LTG 4 Progress  Ongoing  -GJ       User Key  (r) = Recorded By, (t) = Taken By, (c) = Cosigned By    Initials Name Provider Type    Rashawn Brock, PT Physical Therapist          Therapy Education  Education Details: discussed activity modifications,, ergonomics, fatmata at work and computer station set up, encouraged sitting back to the back of the chair and avoid leaning forwward,encouraged use of external keyy board for lap top.  Given: HEP, Symptoms/condition management, Pain management, Mobility training, Posture/body mechanics, Fall prevention and home safety  Program: New  How Provided: Verbal, Demonstration, Written  Provided to: Patient  Level of Understanding:  Teach back education performed, Verbalized, Demonstrated              Time Calculation:   Start Time: 0701  Stop Time: 0746  Time Calculation (min): 45 min  Therapy Charges for Today     Code Description Service Date Service Provider Modifiers Qty    77114649603 HC PT THER PROC EA 15 MIN 1/28/2020 Rashawn Jeffrey, PT GP 3                    Rashawn Jeffrey, PT  1/28/2020

## 2020-02-04 ENCOUNTER — HOSPITAL ENCOUNTER (OUTPATIENT)
Dept: PHYSICAL THERAPY | Facility: HOSPITAL | Age: 60
Setting detail: THERAPIES SERIES
Discharge: HOME | End: 2020-02-04

## 2020-02-04 ENCOUNTER — TELEPHONE (OUTPATIENT)
Dept: INTERNAL MEDICINE | Facility: CLINIC | Age: 60
End: 2020-02-04

## 2020-02-04 DIAGNOSIS — M54.50 ACUTE RIGHT-SIDED LOW BACK PAIN, UNSPECIFIED WHETHER SCIATICA PRESENT: Primary | ICD-10-CM

## 2020-02-04 PROCEDURE — 97110 THERAPEUTIC EXERCISES: CPT | Performed by: PHYSICAL THERAPIST

## 2020-02-04 NOTE — TELEPHONE ENCOUNTER
Patient's  is going to come by to drop off an application that has been notorized but a Physician's or a PA's signature is required to complete that application. Patient was wanting to see if it's possible to get it signed then when  comes in.     Patient Callback # [phone]

## 2020-02-04 NOTE — THERAPY TREATMENT NOTE
Outpatient Physical Therapy Ortho Treatment Note  [facility]     Patient Name: Breana Arreola  : 1960  MRN: [id number]  Today's Date: 2020      Visit Date: 2020    Visit Dx:    ICD-10-CM ICD-9-CM   1. Acute right-sided low back pain, unspecified whether sciatica present M54.5 724.2       Patient Active Problem List   Diagnosis   • Hypertension   • Elevated fasting glucose   • Vitamin D deficiency   • Adiposity   • Difficulty hearing   • Abnormal blood chemistry        Past Medical History:   Diagnosis Date   • Elevated fasting glucose    • Hypertension    • Vitamin D deficiency         Past Surgical History:   Procedure Laterality Date   • FOOT SURGERY Left     At age 9 for pronated foot   • MOHS SURGERY      Basal cell carcinoma excision, nose                       PT Assessment/Plan     Row Name 20 0938          PT Assessment    Assessment Comments  Ms. arreola returns, today she is ambulating without AD, mild limp, which may be her baseline (secondary to L hip OA, mild LLD, and R navicular dropping). We were able to progress her strengthening program today without immediate adveres response.  Ms. Arreola is progressing toward functional goals at this time and remains motivated..  She has a cruise coming up in the next two weeks.   -        PT Plan    PT Plan Comments  assess response to progression of exercises. Continue to strengthen hip girdle/core exercises, ? 4 inch step up (R)  -       User Key  (r) = Recorded By, (t) = Taken By, (c) = Cosigned By    Initials Name Provider Type    Rashawn Brock, PT Physical Therapist            OP Exercises     Row Name 20 0806 20 0800          Subjective Comments    Subjective Comments  --  appt time 0830, arrival 0846.  I went to work yesterday for the first time.  My back tightened up on me, but it wasn't too bad.  I still don't feel like I can go up stairs, I feel like my balance is off, still have numbness in my (R) leg.    -GJ        Subjective Pain    Pre-Treatment Pain Level  --  2  -GJ        Total Minutes    85390 - PT Therapeutic Exercise Minutes  35  -GJ  --        Exercise 1    Exercise Name 1  --  Nustep, UE/LE  -GJ     Time 1  --  5 min  -GJ        Exercise 2    Exercise Name 2  --  standing HS curl, B  -GJ     Cueing 2  --  Verbal;Demo  -GJ     Reps 2  --  15  -GJ     Additional Comments  --  2#  -GJ        Exercise 4    Exercise Name 4  --  LAQ, B  -GJ     Cueing 4  --  Verbal;Demo  -GJ     Reps 4  --  15  -GJ     Additional Comments  --  3#  -GJ        Exercise 5    Exercise Name 5  --  resisted DF, R  -GJ     Cueing 5  --  Verbal;Demo  -GJ     Reps 5  --  20  -GJ     Additional Comments  --  RTB  -GJ        Exercise 6    Exercise Name 6  --  HL hip abd  -GJ     Cueing 6  --  Verbal;Demo  -GJ     Reps 6  --  12  -GJ     Additional Comments  --  RTB  -GJ        Exercise 7    Exercise Name 7  --  HL hip add  -GJ     Cueing 7  --  Verbal;Demo  -GJ     Reps 7  --  12  -GJ     Additional Comments  --  ball  -GJ        Exercise 8    Exercise Name 8  --  LTR  -GJ     Cueing 8  --  Verbal;Demo  -GJ     Reps 8  --  10  -GJ     Time 8  --  5 s  -GJ        Exercise 9    Exercise Name 9  --  piriformis stretch  -GJ     Cueing 9  --  Verbal;Demo  -GJ     Reps 9  --  3  -GJ     Time 9  --  20 s  -GJ     Additional Comments  --  R leg only , figure 4 position  -GJ        Exercise 10    Exercise Name 10  --  PPT  -GJ     Cueing 10  --  Verbal;Demo  -GJ     Reps 10  --  15  -GJ     Time 10  --  5 s  -GJ        Exercise 11    Exercise Name 11  --  standing hip abd, B  -GJ     Cueing 11  --  Verbal;Demo  -GJ     Reps 11  --  12  -GJ     Additional Comments  --  2#  -GJ        Exercise 12    Exercise Name 12  --  standing Row  -GJ     Cueing 12  --  Verbal;Demo  -GJ     Reps 12  --  12  -GJ     Time 12  --  RTB  -GJ        Exercise 13    Exercise Name 13  --  tandem stance, both feet in front  -GJ     Cueing 13  --  Verbal;Demo  -GJ     Reps  13  --  2  -GJ     Time 13  --  20 s, each   -GJ        Exercise 14    Exercise Name 14  --  bridge  -GJ     Cueing 14  --  Verbal;Demo  -GJ     Reps 14  --  10  -GJ     Time 14  --  5 s  -GJ       User Key  (r) = Recorded By, (t) = Taken By, (c) = Cosigned By    Initials Name Provider Type    Rashawn Brock, PT Physical Therapist                       PT OP Goals     Row Name 02/04/20 0800          PT Short Term Goals    STG Date to Achieve  02/28/20  -GJ     STG 1  pt. to be I with initial HEP to facilitate self management of their condition  -GJ     STG 1 Progress  Partially Met  -GJ     STG 2  pt. to be educated in/verbalize understanding of the importance of posture/ergonomics in association with their condition to facilitate self management of their condition  -GJ     STG 2 Progress  Partially Met  -GJ     STG 3  pt to complete Oswestry score questionaire to facilitate appropriate progression of treatments  -GJ     STG 3 Progress  Met  -GJ     STG 3 Progress Comments  36%  -GJ        Long Term Goals    LTG Date to Achieve  04/24/20  -GJ     LTG 1  pt. to be I with advanced HEP to facilitate self management of their condition  -GJ     LTG 1 Progress  Ongoing  -GJ     LTG 2  pt. to report an Oswestry score >/= 26% demonstrate decreased level of perceived disability  -GJ     LTG 2 Progress  Ongoing  -GJ     LTG 3  pt. to ascend/descends stairs with reciprocal pattern (</= 1 rails) to facilitate ease/safety of household/community mobility  -GJ     LTG 3 Progress  Ongoing  -GJ     LTG 4  pt to report >/= 50% improvement in her condition to facilitate ease/safety of going on cruise  -GJ     LTG 4 Progress  Ongoing  -GJ       User Key  (r) = Recorded By, (t) = Taken By, (c) = Cosigned By    Initials Name Provider Type    Rashawn Brock, PT Physical Therapist          Therapy Education  Given: HEP, Symptoms/condition management, Pain management, Mobility training, Fall prevention and home safety  Program: New,  Progressed, Reinforced  How Provided: Verbal, Demonstration, Written  Provided to: Patient  Level of Understanding: Teach back education performed, Verbalized, Demonstrated    Outcome Measure Options: Modifed Owestry  Modified Oswestry  Modified Oswestry Score/Comments: 36%      Time Calculation:   Start Time: 0845(appt time 0830)  Stop Time: 0922  Time Calculation (min): 37 min  Therapy Charges for Today     Code Description Service Date Service Provider Modifiers Qty    71942781054 HC PT THER PROC EA 15 MIN 2/4/2020 Rashawn Jeffrey, PT GP 2          PT G-Codes  Outcome Measure Options: Modifed Owestry  Modified Oswestry Score/Comments: 36%         Rashawn Jeffrey, PT  2/4/2020

## 2020-02-06 ENCOUNTER — HOSPITAL ENCOUNTER (OUTPATIENT)
Dept: PHYSICAL THERAPY | Facility: HOSPITAL | Age: 60
Setting detail: THERAPIES SERIES
Discharge: HOME | End: 2020-02-06

## 2020-02-06 DIAGNOSIS — M54.50 ACUTE RIGHT-SIDED LOW BACK PAIN, UNSPECIFIED WHETHER SCIATICA PRESENT: Primary | ICD-10-CM

## 2020-02-06 PROCEDURE — 97112 NEUROMUSCULAR REEDUCATION: CPT | Performed by: PHYSICAL THERAPIST

## 2020-02-06 PROCEDURE — 97110 THERAPEUTIC EXERCISES: CPT | Performed by: PHYSICAL THERAPIST

## 2020-02-06 NOTE — THERAPY TREATMENT NOTE
Outpatient Physical Therapy Ortho Treatment Note  [facility]     Patient Name: Breana Arreola  : 1960  MRN: [id number]  Today's Date: 2020      Visit Date: 2020    Visit Dx:    ICD-10-CM ICD-9-CM   1. Acute right-sided low back pain, unspecified whether sciatica present M54.5 724.2       Patient Active Problem List   Diagnosis   • Hypertension   • Elevated fasting glucose   • Vitamin D deficiency   • Adiposity   • Difficulty hearing   • Abnormal blood chemistry        Past Medical History:   Diagnosis Date   • Elevated fasting glucose    • Hypertension    • Vitamin D deficiency         Past Surgical History:   Procedure Laterality Date   • FOOT SURGERY Left     At age 9 for pronated foot   • MOHS SURGERY      Basal cell carcinoma excision, nose       PT Ortho     Row Name 20       Subjective Comments    Subjective Comments  Pt reports unsteadiness/decreased balance is greatest complaint this morning  -JS       Subjective Pain    Pre-Treatment Pain Level  2  -JS    Subjective Pain Comment  --  -JS      User Key  (r) = Recorded By, (t) = Taken By, (c) = Cosigned By    Initials Name Provider Type    Maria Luisa Ward, PT Physical Therapist                      PT Assessment/Plan     Row Name 20          PT Assessment    Assessment Comments  Pt presents with stiffness as greatest complaint vs. pain.  Decreased strength/balance noted with step ups leading with R LE, initial decreased balance with tandem stance that improves with repetition. Treatment focused on postural awareness, neuromuscular re-education using TrA stabilization during today's balance & strengthening exercises.   -JS        PT Plan    PT Plan Comments  Continue core/LE strengthening, flexibility and balance ex.   -JS       User Key  (r) = Recorded By, (t) = Taken By, (c) = Cosigned By    Initials Name Provider Type    Maria Luisa Ward, PT Physical Therapist            OP Exercises     Row Name 20 2378              Subjective Comments    Subjective Comments  Pt reports unsteadiness/decreased balance is greatest complaint this morning  -JS         Subjective Pain    Pre-Treatment Pain Level  2  -JS      Subjective Pain Comment  --  -JS         Total Minutes    16909 - PT Therapeutic Exercise Minutes  30  -JS      29113 -  PT Neuromuscular Reeducation Minutes  15  -JS         Exercise 1    Exercise Name 1  NuStep LE only  -JS      Time 1  5 min  -JS      Additional Comments  L3  -JS         Exercise 2    Exercise Name 2  standing HS curl, B  -JS      Cueing 2  Verbal;Demo  -JS      Sets 2  2  -JS      Reps 2  10  -JS      Additional Comments  2#  -JS         Exercise 4    Exercise Name 4  LAQ, B  -JS      Cueing 4  Verbal;Demo  -JS      Sets 4  2  -JS      Reps 4  15  -JS      Additional Comments  3#  -JS         Exercise 5    Exercise Name 5  resisted DF, R  -JS      Cueing 5  Verbal;Demo  -JS      Reps 5  20  -JS      Additional Comments  RTB in sitting  -JS         Exercise 6    Exercise Name 6  HL hip abd  -JS      Cueing 6  Verbal;Demo  -JS      Reps 6  15  -JS      Additional Comments  RTB  -JS         Exercise 7    Exercise Name 7  HL hip add  -JS      Cueing 7  Verbal;Demo  -JS      Reps 7  15  -JS      Additional Comments  ball  -JS         Exercise 8    Exercise Name 8  LTR  -JS      Cueing 8  Verbal;Demo  -JS      Reps 8  10  -JS      Time 8  5 s  -JS         Exercise 10    Exercise Name 10  PPT  -JS      Cueing 10  Verbal  -JS      Reps 10  15  -JS      Time 10  5 s  -JS         Exercise 11    Exercise Name 11  standing hip abd, B  -JS      Cueing 11  Verbal;Demo cues for core stab, upright posture  -JS      Sets 11  2  -JS      Reps 11  10  -JS      Additional Comments  2#  -JS         Exercise 12    Exercise Name 12  standing Row  -JS      Cueing 12  Verbal;Demo cues for upright posture, core stab  -JS      Reps 12  12  -JS      Time 12  RTB  -JS         Exercise 13    Exercise Name 13  tandem stance, both  "feet in front  -JS      Cueing 13  Verbal;Demo  -JS      Reps 13  2  -JS      Time 13  20 s, each   -JS      Additional Comments  in // bars with 0-1 UE support, cues for core stabilization  -JS         Exercise 14    Exercise Name 14  bridge  -JS         Exercise 15    Exercise Name 15  Sidestepping in // bars  -JS      Cueing 15  Verbal;Demo  -JS      Reps 15  3 laps  -JS      Additional Comments  Focus on balance & core stabilization with 0-1 UE support in // bars  -JS         Exercise 16    Exercise Name 16  4\" step ups in // bars  -JS      Cueing 16  Verbal;Demo  -JS      Reps 16  10  -JS      Additional Comments  forward/lat with 1-2 UE support. Focus on balance & postural awareness during ex  -JS        User Key  (r) = Recorded By, (t) = Taken By, (c) = Cosigned By    Initials Name Provider Type    Maria Luisa Ward, PT Physical Therapist                       PT OP Goals     Row Name 02/06/20 0830          PT Short Term Goals    STG Date to Achieve  02/28/20  -JS     STG 1  pt. to be I with initial HEP to facilitate self management of their condition  -JS     STG 1 Progress  Partially Met  -JS     STG 2  pt. to be educated in/verbalize understanding of the importance of posture/ergonomics in association with their condition to facilitate self management of their condition  -     STG 2 Progress  Partially Met  -JS     STG 3  pt to complete Oswestry score questionaire to facilitate appropriate progression of treatments  -JS     STG 3 Progress  Met  -JS        Long Term Goals    LTG Date to Achieve  04/24/20  -JS     LTG 1  pt. to be I with advanced HEP to facilitate self management of their condition  -JS     LTG 1 Progress  Ongoing  -JS     LTG 2  pt. to report an Oswestry score >/= 26% demonstrate decreased level of perceived disability  -JS     LTG 2 Progress  Ongoing  -JS     LTG 3  pt. to ascend/descends stairs with reciprocal pattern (</= 1 rails) to facilitate ease/safety of household/community mobility  " -AMAN     LTG 3 Progress  Ongoing  -JS     LTG 4  pt to report >/= 50% improvement in her condition to facilitate ease/safety of going on cruise  -AMAN     LTG 4 Progress  Ongoing  -JS       User Key  (r) = Recorded By, (t) = Taken By, (c) = Cosigned By    Initials Name Provider Type    Maria Luisa Ward PT Physical Therapist          Therapy Education  Education Details: Reviewed HEP, postural awareness, ergonomics at work desk/computer station with pt verbalizing understanding.  Given: HEP  Program: Reinforced  How Provided: Verbal, Demonstration  Provided to: Patient  Level of Understanding: Teach back education performed, Verbalized, Demonstrated              Time Calculation:   Start Time: 0835  Stop Time: 0920  Time Calculation (min): 45 min  Therapy Charges for Today     Code Description Service Date Service Provider Modifiers Qty    22051516737  PT NEUROMUSC RE EDUCATION EA 15 MIN 2/6/2020 Maria Luisa Toussaint, PT GP 1    63670358511 HC PT THER PROC EA 15 MIN 2/6/2020 Maria Luisa Toussaint, PT GP 2                    Maria Luisa Toussaint PT  2/6/2020

## 2020-02-12 ENCOUNTER — HOSPITAL ENCOUNTER (OUTPATIENT)
Dept: PHYSICAL THERAPY | Facility: HOSPITAL | Age: 60
Setting detail: THERAPIES SERIES
Discharge: HOME | End: 2020-02-12

## 2020-02-12 DIAGNOSIS — M54.50 ACUTE RIGHT-SIDED LOW BACK PAIN, UNSPECIFIED WHETHER SCIATICA PRESENT: Primary | ICD-10-CM

## 2020-02-12 PROCEDURE — 97110 THERAPEUTIC EXERCISES: CPT | Performed by: PHYSICAL THERAPIST

## 2020-02-12 PROCEDURE — 97112 NEUROMUSCULAR REEDUCATION: CPT | Performed by: PHYSICAL THERAPIST

## 2020-02-12 NOTE — THERAPY TREATMENT NOTE
Outpatient Physical Therapy Ortho Treatment Note  [facility]     Patient Name: Breana Arreola  : 1960  MRN: [id number]  Today's Date: 2020      Visit Date: 2020    Visit Dx:    ICD-10-CM ICD-9-CM   1. Acute right-sided low back pain, unspecified whether sciatica present M54.5 724.2       Patient Active Problem List   Diagnosis   • Hypertension   • Elevated fasting glucose   • Vitamin D deficiency   • Adiposity   • Difficulty hearing   • Abnormal blood chemistry        Past Medical History:   Diagnosis Date   • Elevated fasting glucose    • Hypertension    • Vitamin D deficiency         Past Surgical History:   Procedure Laterality Date   • FOOT SURGERY Left     At age 9 for pronated foot   • MOHS SURGERY      Basal cell carcinoma excision, nose                       PT Assessment/Plan     Row Name 20          PT Assessment    Assessment Comments  Ms. Arreola returns today, reporting imiproving condition (~80%).  She demonstrates gait without AD, mild limp, which is likely pre-morbid secondary to known ankle issues/Leg length discrepency.  She demonstrate improving R DF MMT to 4+/5.  Her tandem balance demonstrates signficant improvement.  Ms. Arreola has met several functional goals and is progressing toward all remaining goals.  She is to leave for a cruise in 1.5 weeks.    -GJ        PT Plan    PT Plan Comments  change tandem stance to tandem gait, progress to 6 inch step ups, consider adding blue foam to balance activities.  2 additional sessions prior to cruise  -       User Key  (r) = Recorded By, (t) = Taken By, (c) = Cosigned By    Initials Name Provider Type    Rashawn Brock, PT Physical Therapist            OP Exercises     Row Name 2027 20 08          Subjective Comments    Subjective Comments  --  I feel steadier on my feet, Yesterday was the first day I could get in my car and not my husbands. I still feel unconfortable on stairs.   -EDDIE         Subjective Pain    Pre-Treatment Pain Level  --  0  -GJ     Subjective Pain Comment  --  no pain today, more tightness in my back  -GJ        Total Minutes    29355 - PT Therapeutic Exercise Minutes  35  -GJ  --     80095 -  PT Neuromuscular Reeducation Minutes  14  -GJ  --        Exercise 1    Exercise Name 1  --  NuStep LE only  -GJ     Time 1  --  5 min  -GJ        Exercise 2    Exercise Name 2  --  standing HS curl, B  -GJ     Cueing 2  --  Verbal;Demo  -GJ     Sets 2  --  2  -GJ     Reps 2  --  10  -GJ     Additional Comments  --  3#  -GJ        Exercise 4    Exercise Name 4  --  LAQ, B  -GJ     Cueing 4  --  Verbal;Demo  -GJ     Sets 4  --  2  -GJ     Reps 4  --  15  -GJ     Additional Comments  --  4#  -GJ        Exercise 5    Exercise Name 5  --  resisted DF, R  -GJ     Cueing 5  --  Verbal;Demo  -GJ     Reps 5  --  20  -GJ     Additional Comments  --  RTB in sitting  -GJ        Exercise 6    Exercise Name 6  --  HL hip abd  -GJ     Cueing 6  --  Verbal;Demo  -GJ     Reps 6  --  15  -GJ     Additional Comments  --  GTB, alternating, hands on ASIS for self awareness of rocking  -GJ        Exercise 7    Exercise Name 7  --  HL hip add  -GJ     Cueing 7  --  Verbal;Demo  -GJ     Reps 7  --  15  -GJ     Additional Comments  --  ball  -GJ        Exercise 8    Exercise Name 8  --  LTR  -GJ     Cueing 8  --  Verbal;Demo  -GJ     Reps 8  --  10  -GJ     Time 8  --  5 s  -GJ        Exercise 10    Exercise Name 10  --  PPT  -GJ     Cueing 10  --  Verbal  -GJ     Reps 10  --  15  -GJ     Time 10  --  5 s  -GJ        Exercise 11    Exercise Name 11  --  standing hip abd, B  -GJ     Cueing 11  --  Verbal;Demo  -GJ     Sets 11  --  2  -GJ     Reps 11  --  10  -GJ     Additional Comments  --  3#  -GJ        Exercise 12    Exercise Name 12  --  standing Row  -GJ     Cueing 12  --  Verbal;Demo  -GJ     Reps 12  --  20  -GJ     Time 12  --  RTB  -GJ     Additional Comments  --  staggard stance, 10 with R in front, 10 with L  "in front  -GJ        Exercise 13    Exercise Name 13  --  tandem stance, both feet in front  -GJ     Cueing 13  --  Verbal;Demo  -GJ     Reps 13  --  3  -GJ     Time 13  --  20 s, each   -GJ     Additional Comments  --  0 UE support, much improved balance  -GJ        Exercise 14    Exercise Name 14  --  bridge  -GJ     Cueing 14  --  Verbal;Demo  -GJ     Reps 14  --  10  -GJ     Time 14  --  5 s  -GJ     Additional Comments  --  ball squeeze  -GJ        Exercise 15    Exercise Name 15  --  Sidestepping 1 UE support  -GJ     Cueing 15  --  Verbal;Demo  -GJ     Reps 15  --  3 laps  -GJ     Additional Comments  --   ytb, Focus on balance & core stabilization with 0-1 UE support in // bars  -GJ        Exercise 16    Exercise Name 16  --  4\" step ups in // bars  -GJ     Cueing 16  --  Verbal;Demo  -GJ     Reps 16  --  12  -GJ     Time 16  --  progress to 6 inch step next session  -GJ     Additional Comments  --  forward/lat with 1-2 UE support. Focus on balance & postural awareness during ex  -GJ       User Key  (r) = Recorded By, (t) = Taken By, (c) = Cosigned By    Initials Name Provider Type    GJ Rashawn Jeffrey, PT Physical Therapist                       PT OP Goals     Row Name 02/12/20 0800          PT Short Term Goals    STG Date to Achieve  02/28/20  -GJ     STG 1  pt. to be I with initial HEP to facilitate self management of their condition  -GJ     STG 1 Progress  Met  -GJ     STG 2  pt. to be educated in/verbalize understanding of the importance of posture/ergonomics in association with their condition to facilitate self management of their condition  -GJ     STG 2 Progress  Met  -GJ     STG 3  pt to complete Oswestry score questionaire to facilitate appropriate progression of treatments  -GJ     STG 3 Progress  Met  -GJ        Long Term Goals    LTG Date to Achieve  04/24/20  -GJ     LTG 1  pt. to be I with advanced HEP to facilitate self management of their condition  -GJ     LTG 1 Progress  " Ongoing;Progressing  -GJ     LTG 2  pt. to report an Oswestry score >/= 26% demonstrate decreased level of perceived disability  -GJ     LTG 2 Progress  Ongoing  -GJ     LTG 3  pt. to ascend/descends stairs with reciprocal pattern (</= 1 rails) to facilitate ease/safety of household/community mobility  -GJ     LTG 3 Progress  Ongoing  -GJ     LTG 3 Progress Comments  working on stairs currently  -GJ     LTG 4  pt to report >/= 50% improvement in her condition to facilitate ease/safety of going on cruise  -GJ     LTG 4 Progress  Met  -GJ     LTG 4 Progress Comments  pt reports 80% improvement  -       User Key  (r) = Recorded By, (t) = Taken By, (c) = Cosigned By    Initials Name Provider Type     Rashawn Jeffrey, PT Physical Therapist          Therapy Education  Given: HEP, Symptoms/condition management, Pain management, Mobility training, Posture/body mechanics, Fall prevention and home safety  Program: Reinforced, Progressed  How Provided: Verbal, Demonstration  Provided to: Patient  Level of Understanding: Teach back education performed, Verbalized, Demonstrated              Time Calculation:   Start Time: 0827  Stop Time: 0916  Time Calculation (min): 49 min  Therapy Charges for Today     Code Description Service Date Service Provider Modifiers Qty    23497102145  PT NEUROMUSC RE EDUCATION EA 15 MIN 2/12/2020 Rashawn Jeffrey, PT GP 1    [id number]  PT THER PROC EA 15 MIN 2/12/2020 Rashawn Jeffrey, PT GP 2                    Rashawn Jeffrey PT  2/12/2020

## 2020-02-14 ENCOUNTER — HOSPITAL ENCOUNTER (OUTPATIENT)
Dept: PHYSICAL THERAPY | Facility: HOSPITAL | Age: 60
Setting detail: THERAPIES SERIES
Discharge: HOME | End: 2020-02-14

## 2020-02-14 DIAGNOSIS — M54.50 ACUTE RIGHT-SIDED LOW BACK PAIN, UNSPECIFIED WHETHER SCIATICA PRESENT: Primary | ICD-10-CM

## 2020-02-14 PROCEDURE — 97530 THERAPEUTIC ACTIVITIES: CPT | Performed by: PHYSICAL THERAPIST

## 2020-02-14 PROCEDURE — 97110 THERAPEUTIC EXERCISES: CPT | Performed by: PHYSICAL THERAPIST

## 2020-02-14 NOTE — THERAPY TREATMENT NOTE
Outpatient Physical Therapy Ortho Treatment Note  [facility]     Patient Name: Breana Arreola  : 1960  MRN: [id number]  Today's Date: 2020      Visit Date: 2020    Visit Dx:    ICD-10-CM ICD-9-CM   1. Acute right-sided low back pain, unspecified whether sciatica present M54.5 724.2       Patient Active Problem List   Diagnosis   • Hypertension   • Elevated fasting glucose   • Vitamin D deficiency   • Adiposity   • Difficulty hearing   • Abnormal blood chemistry        Past Medical History:   Diagnosis Date   • Elevated fasting glucose    • Hypertension    • Vitamin D deficiency         Past Surgical History:   Procedure Laterality Date   • FOOT SURGERY Left     At age 9 for pronated foot   • MOHS SURGERY      Basal cell carcinoma excision, nose                       PT Assessment/Plan     Row Name 20 0912          PT Assessment    Assessment Comments  Ms. Arreola returns today, reporting set back. She reports R/L knee buckling while ascending stairs.  She states she is ok.  she presents today without AD, ambulating cautiously.  We pulled back on her strengthening exercises today, but did add quad set bilateraly.  ENcouraged her to use cane at home over  weekend and to take it generally easy.  Unsure of reason for buckling/fall.  She is scheduled to take a cruise on Wed of next week.  We will see her one additional time prior to her trip. She demosntrates good (4+/5) MMT R DF.  Ms. Arreola contnues to be a good candidate for skilled physical therapy.   -GJ        PT Plan    PT Plan Comments  assess condition, if able resume balance activities, may be cautious re: step activities secondary to leaving on cruise the day after next session   -       User Key  (r) = Recorded By, (t) = Taken By, (c) = Cosigned By    Initials Name Provider Type    Rashawn Brock, PT Physical Therapist          Modalities     Row Name 20 0800             Ice    Ice Applied  Yes  -EDDIE      Location   ice to bilateral knees, pt supine  -GJ      Rx Minutes  10 mins  -GJ      Ice S/P Rx  Yes  -GJ        User Key  (r) = Recorded By, (t) = Taken By, (c) = Cosigned By    Initials Name Provider Type    Rashawn Brock, PT Physical Therapist        OP Exercises     Row Name 02/14/20 0820 02/14/20 0800          Subjective Comments    Subjective Comments  --  I've had a set back since last visit, I felt some pinching in my back.  THen I was going up stairs, my R knee buckled, then my L buckled as well and I fell forward.    -GJ        Subjective Pain    Pre-Treatment Pain Level  --  3  -GJ        Total Minutes    28403 - PT Therapeutic Exercise Minutes  18  -GJ  --     67804 - PT Therapeutic Activity Minutes  10 education/answering questions  -GJ  --        Exercise 1    Exercise Name 1  --  NuStep LE only  -GJ     Time 1  --  5 min  -GJ        Exercise 5    Exercise Name 5  --  resisted DF, R  -GJ     Cueing 5  --  Verbal;Demo  -GJ     Reps 5  --  20  -GJ     Additional Comments  --  RTB in sitting  -GJ        Exercise 8    Exercise Name 8  --  LTR  -GJ     Cueing 8  --  Verbal;Demo  -GJ     Reps 8  --  10  -GJ     Time 8  --  5 s  -GJ        Exercise 9    Exercise Name 9  --  QS, B  -GJ     Cueing 9  --  Verbal;Demo  -GJ     Reps 9  --  15  -GJ     Time 9  --   5s  -GJ        Exercise 10    Exercise Name 10  --  PPT  -GJ     Cueing 10  --  Verbal  -GJ     Reps 10  --  15  -GJ     Time 10  --  5 s  -GJ       User Key  (r) = Recorded By, (t) = Taken By, (c) = Cosigned By    Initials Name Provider Type    Rashawn Brock, PT Physical Therapist                       PT OP Goals     Row Name 02/14/20 0800          PT Short Term Goals    STG Date to Achieve  02/28/20  -GJ     STG 1  pt. to be I with initial HEP to facilitate self management of their condition  -GJ     STG 1 Progress  Met  -GJ     STG 2  pt. to be educated in/verbalize understanding of the importance of posture/ergonomics in association with their  condition to facilitate self management of their condition  -GJ     STG 2 Progress  Met  -GJ     STG 3  pt to complete Oswestry score questionaire to facilitate appropriate progression of treatments  -GJ     STG 3 Progress  Met  -        Long Term Goals    LTG Date to Achieve  04/24/20  -GJ     LTG 1  pt. to be I with advanced HEP to facilitate self management of their condition  -GJ     LTG 1 Progress  Ongoing;Progressing  -GJ     LTG 2  pt. to report an Oswestry score >/= 26% demonstrate decreased level of perceived disability  -GJ     LTG 2 Progress  Ongoing  -GJ     LTG 3  pt. to ascend/descends stairs with reciprocal pattern (</= 1 rails) to facilitate ease/safety of household/community mobility  -GJ     LTG 3 Progress  Ongoing  -GJ     LTG 4  pt to report >/= 50% improvement in her condition to facilitate ease/safety of going on cruise  -     LTG 4 Progress  Met  -       User Key  (r) = Recorded By, (t) = Taken By, (c) = Cosigned By    Initials Name Provider Type    Rashawn Brock, PT Physical Therapist          Therapy Education  Education Details: encoruaged pt to use her cane to allow for decreased stress to tissue.  Encouraged pt. to take it easy this weekend.    Given: HEP, Symptoms/condition management, Pain management, Mobility training, Posture/body mechanics, Fall prevention and home safety, Edema management  Program: New, Reinforced, Progressed  How Provided: Verbal, Demonstration, Written  Provided to: Patient  Level of Understanding: Teach back education performed, Verbalized, Demonstrated              Time Calculation:   Start Time: 0825  Stop Time: 0908  Time Calculation (min): 43 min  Therapy Charges for Today     Code Description Service Date Service Provider Modifiers Qty    01147583508 HC PT HOT OR COLD PACK TREAT MCARE 2/14/2020 Rashawn Jeffrey, PT GP 1    06645107478 HC PT THER PROC EA 15 MIN 2/14/2020 Rashawn Jeffrey, PT GP 1    29100941197 HC PT THERAPEUTIC ACT EA 15 MIN  2/14/2020 Rashawn Jeffrey, PT GP 1                    Rashawn Jeffrey, PT  2/14/2020

## 2020-02-18 ENCOUNTER — APPOINTMENT (OUTPATIENT)
Dept: PHYSICAL THERAPY | Facility: HOSPITAL | Age: 60
End: 2020-02-18

## 2020-02-18 ENCOUNTER — HOSPITAL ENCOUNTER (OUTPATIENT)
Dept: PHYSICAL THERAPY | Facility: HOSPITAL | Age: 60
Setting detail: THERAPIES SERIES
Discharge: HOME | End: 2020-02-18

## 2020-02-18 DIAGNOSIS — M54.50 ACUTE RIGHT-SIDED LOW BACK PAIN, UNSPECIFIED WHETHER SCIATICA PRESENT: Primary | ICD-10-CM

## 2020-02-18 PROCEDURE — 97110 THERAPEUTIC EXERCISES: CPT | Performed by: PHYSICAL THERAPIST

## 2020-02-18 NOTE — THERAPY TREATMENT NOTE
Outpatient Physical Therapy Ortho Treatment Note  [facility]     Patient Name: Breana Arreola  : 1960  MRN: [id number]  Today's Date: 2020      Visit Date: 2020    Visit Dx:    ICD-10-CM ICD-9-CM   1. Acute right-sided low back pain, unspecified whether sciatica present M54.5 724.2       Patient Active Problem List   Diagnosis   • Hypertension   • Elevated fasting glucose   • Vitamin D deficiency   • Adiposity   • Difficulty hearing   • Abnormal blood chemistry        Past Medical History:   Diagnosis Date   • Elevated fasting glucose    • Hypertension    • Vitamin D deficiency         Past Surgical History:   Procedure Laterality Date   • FOOT SURGERY Left     At age 9 for pronated foot   • MOHS SURGERY      Basal cell carcinoma excision, nose                       PT Assessment/Plan     Row Name 20 0817          PT Assessment    Assessment Comments  Ms. Arreola reports she is doing better today. We gently added several standing activities back to her program, but continued to hold on several exercises secondary to not wanting to exacerbate her condition prior to her vacation starting tomorrow. We reviewed activity modifications, cautioned re: trying to over do things on her trip.  We will see her when she returns should she need it to reassess her condition.    -GJ        PT Plan    PT Plan Comments  pt to go on vacation, we will reassess her condition when she returns.  -GJ       User Key  (r) = Recorded By, (t) = Taken By, (c) = Cosigned By    Initials Name Provider Type    Rashawn Brock, PT Physical Therapist          Modalities     Row Name 20 0700             Ice    Ice Applied  Yes  -GJ      Location  ice to bilateral knees, pt supine  -GJ      Rx Minutes  10 mins  -GJ      Ice S/P Rx  Yes  -GJ        User Key  (r) = Recorded By, (t) = Taken By, (c) = Cosigned By    Initials Name Provider Type    Rashawn Brock, PT Physical Therapist        OP Exercises     Row  Name 02/18/20 0752 02/18/20 0700          Subjective Comments    Subjective Comments  --  the exacerbation from last week is better. The ice on  my knee really helped. In the morning I still feel unsteady.  It improves as the day goes on I even worked a full day for the first time in over a month.   -GJ        Total Minutes    85147 - PT Therapeutic Exercise Minutes  33  -GJ  --        Exercise 1    Exercise Name 1  --  NuStep LE only  -GJ     Time 1  --  5 min  -GJ        Exercise 5    Exercise Name 5  --  resisted DF, R  -GJ     Cueing 5  --  Verbal;Demo  -GJ     Reps 5  --  20  -GJ     Additional Comments  --  RTB in sitting  -GJ        Exercise 8    Exercise Name 8  --  LTR  -GJ     Cueing 8  --  Verbal;Demo  -GJ     Reps 8  --  10  -GJ     Time 8  --  5 s  -GJ        Exercise 9    Exercise Name 9  --  QS, B  -GJ     Cueing 9  --  Verbal;Demo  -GJ     Reps 9  --  15  -GJ     Time 9  --   5s  -GJ        Exercise 10    Exercise Name 10  --  PPT  -GJ     Cueing 10  --  Verbal  -GJ     Reps 10  --  15  -GJ     Time 10  --  5 s  -GJ        Exercise 12    Exercise Name 12  --  standing Row  -GJ     Cueing 12  --  Verbal;Demo  -GJ     Reps 12  --  20  -GJ     Time 12  --  RTB  -GJ     Additional Comments  --  staggard stance, 10 with R in front, 10 with L in front  -GJ        Exercise 13    Exercise Name 13  --  tandem stance, both feet in front  -GJ     Cueing 13  --  Verbal;Demo  -GJ     Reps 13  --  3  -GJ     Time 13  --  20 s, each   -GJ       User Key  (r) = Recorded By, (t) = Taken By, (c) = Cosigned By    Initials Name Provider Type    GJ Rashawn Jeffrey W, PT Physical Therapist                       PT OP Goals     Row Name 02/18/20 0700          PT Short Term Goals    STG Date to Achieve  02/28/20  -GJ     STG 1  pt. to be I with initial HEP to facilitate self management of their condition  -GJ     STG 1 Progress  Met  -GJ     STG 2  pt. to be educated in/verbalize understanding of the importance of  posture/ergonomics in association with their condition to facilitate self management of their condition  -GJ     STG 2 Progress  Met  -GJ     STG 3  pt to complete Oswestry score questionaire to facilitate appropriate progression of treatments  -GJ     STG 3 Progress  Met  -GJ        Long Term Goals    LTG Date to Achieve  04/24/20  -GJ     LTG 1  pt. to be I with advanced HEP to facilitate self management of their condition  -GJ     LTG 1 Progress  Ongoing;Progressing  -GJ     LTG 2  pt. to report an Oswestry score >/= 26% demonstrate decreased level of perceived disability  -GJ     LTG 2 Progress  Ongoing  -GJ     LTG 3  pt. to ascend/descends stairs with reciprocal pattern (</= 1 rails) to facilitate ease/safety of household/community mobility  -GJ     LTG 3 Progress  Ongoing  -GJ     LTG 4  pt to report >/= 50% improvement in her condition to facilitate ease/safety of going on cruise  -GJ     LTG 4 Progress  Met  -       User Key  (r) = Recorded By, (t) = Taken By, (c) = Cosigned By    Initials Name Provider Type     Rashawn Jeffrey, PT Physical Therapist          Therapy Education  Education Details: cautioned her on trying to over do it on her vacation,discussed activity modifications,   Given: HEP, Symptoms/condition management, Pain management, Posture/body mechanics  Program: Reinforced  How Provided: Verbal  Provided to: Patient  Level of Understanding: Teach back education performed, Verbalized, Demonstrated              Time Calculation:   Start Time: 0752(appt time 0745)  Stop Time: 0837  Time Calculation (min): 45 min  Therapy Charges for Today     Code Description Service Date Service Provider Modifiers Qty    48418304957  PT THER PROC EA 15 MIN 2/18/2020 Rashawn Jeffrey, PT GP 2    [id number]  PT HOT OR COLD PACK TREAT MCARE 2/18/2020 Rashawn Jeffrey, PT GP 1                    Rashawn Jeffrey PT  2/18/2020

## 2020-02-20 ENCOUNTER — APPOINTMENT (OUTPATIENT)
Dept: PHYSICAL THERAPY | Facility: HOSPITAL | Age: 60
End: 2020-02-20

## 2020-02-24 ENCOUNTER — APPOINTMENT (OUTPATIENT)
Dept: PHYSICAL THERAPY | Facility: HOSPITAL | Age: 60
End: 2020-02-24

## 2020-03-04 ENCOUNTER — HOSPITAL ENCOUNTER (OUTPATIENT)
Dept: PHYSICAL THERAPY | Facility: HOSPITAL | Age: 60
Setting detail: THERAPIES SERIES
Discharge: HOME | End: 2020-03-04

## 2020-03-04 DIAGNOSIS — M54.50 ACUTE RIGHT-SIDED LOW BACK PAIN, UNSPECIFIED WHETHER SCIATICA PRESENT: Primary | ICD-10-CM

## 2020-03-04 PROCEDURE — 97110 THERAPEUTIC EXERCISES: CPT | Performed by: PHYSICAL THERAPIST

## 2020-03-04 NOTE — THERAPY PROGRESS REPORT/RE-CERT
Outpatient Physical Therapy Ortho Progress Note  [facility]     Patient Name: Breana Arreola  : 1960  MRN: [id number]  Today's Date: 3/4/2020      Visit Date: 2020    Visit Dx:    ICD-10-CM ICD-9-CM   1. Acute right-sided low back pain, unspecified whether sciatica present M54.5 724.2       Patient Active Problem List   Diagnosis   • Hypertension   • Elevated fasting glucose   • Vitamin D deficiency   • Adiposity   • Difficulty hearing   • Abnormal blood chemistry        Past Medical History:   Diagnosis Date   • Elevated fasting glucose    • Hypertension    • Vitamin D deficiency         Past Surgical History:   Procedure Laterality Date   • FOOT SURGERY Left     At age 9 for pronated foot   • MOHS SURGERY      Basal cell carcinoma excision, nose       PT Ortho     Row Name 20 0900       Myotomal Screen- Lower Quarter Clearing    Hip flexion (L2)  Bilateral:;4 (Good);4+ (Good +)  -GJ    Knee extension (L3)  Bilateral:;4+ (Good +)  -GJ    Ankle DF (L4)  Bilateral:;4+ (Good +)  -GJ    Knee flexion (S2)  Bilateral:;4+ (Good +)  -GJ      User Key  (r) = Recorded By, (t) = Taken By, (c) = Cosigned By    Initials Name Provider Type    Rashawn Brock, PT Physical Therapist                      PT Assessment/Plan     Row Name 20 5725          PT Assessment    Functional Limitations  Limitations in community activities;Impaired gait;Performance in leisure activities;Limitation in home management  -GJ     Impairments  Pain;Impaired postural alignment;Impaired muscle endurance;Gait;Muscle strength;Poor body mechanics;Impaired flexibility  -     Assessment Comments  Ms. Arreola has attended 8 sessions of physical therapy for her R sided LBP/RLE pain.  She returns today, after being on vacation on a cruise. Overall, she reports improvement in her condition, but voices concerns with stairs/curbs.  She demonstrates improved lower quarter myotomal strength bilaterally.  Today, she is ambulating  "without AD with likely her baseline gait pattern (PMHx of LLD, pes planus R>L, ER'd.).  Ms. Arreola reports an Oswestry score of 44%, score 0-100, 0 represents no perceived disability.  This is up from her initial evaluation score of 36%, not an MCID.  Ms. Arreola wishes to go to once every other week x 3-4 sesssions to facilitate improved ability/confidence with stairs/curbs.    -GJ     Please refer to paper survey for additional self-reported information  Yes  -GJ     Rehab Potential  Good  -GJ     Patient/caregiver participated in establishment of treatment plan and goals  Yes  -GJ     Patient would benefit from skilled therapy intervention  Yes  -GJ        PT Plan    PT Frequency  1x/week;2x/week  -GJ     Predicted Duration of Therapy Intervention (Therapy Eval)  8 sessions   -GJ     Planned CPT's?  PT RE-EVAL: 35144;PT THER ACT EA 15 MIN: 15873;PT NEUROMUSC RE-EDUCATION EA 15 MIN: 10757;PT MANUAL THERAPY EA 15 MIN: 56751;PT THER PROC EA 15 MIN: 71074;PT GAIT TRAINING EA 15 MIN: 22172;PT HOT OR COLD PACK TREAT MCARE  -GJ     PT Plan Comments  work on step ups/downs, squats, curbs, to see once every other week for 3-4 weeks.   -GJ       User Key  (r) = Recorded By, (t) = Taken By, (c) = Cosigned By    Initials Name Provider Type     Rashawn Jeffrey, PT Physical Therapist            OP Exercises     Row Name 03/04/20 1001 03/04/20 0900          Subjective Comments    Subjective Comments  --  i have pinching in my back.  I still have concern with stairs, fatmata. curbs in the public.  I'd say I\"m about 90% better overall  -GJ        Subjective Pain    Pre-Treatment Pain Level  --  2  -GJ        Total Minutes    56001 - PT Therapeutic Exercise Minutes  45  -GJ  --        Exercise 1    Exercise Name 1  --  NuStep LE only  -GJ     Time 1  --  5 min  -GJ        Exercise 2    Exercise Name 2  --  standing HS curl, B  -GJ     Cueing 2  --  Verbal;Demo  -GJ     Reps 2  --  12  -GJ     Additional Comments  --  B  -GJ        " Exercise 3    Exercise Name 3  --  standing HS stretch, at step  -GJ     Cueing 3  --  Verbal;Demo  -GJ     Reps 3  --  3  -GJ     Time 3  --  20 s  -GJ        Exercise 4    Exercise Name 4  --  LAQ, B  -GJ     Cueing 4  --  Verbal;Demo  -GJ     Reps 4  --  15  -GJ     Additional Comments  --  4#  -GJ        Exercise 5    Exercise Name 5  --  resisted DF, R  -GJ     Cueing 5  --  Verbal;Demo  -GJ     Reps 5  --  20  -GJ     Additional Comments  --  RTB in sitting  -GJ        Exercise 7    Exercise Name 7  --  step up, 6 inch step  -GJ     Cueing 7  --  Verbal;Demo  -GJ     Reps 7  --  10  -GJ     Additional Comments  --  BUE, next session, 1 UE  -GJ        Exercise 11    Exercise Name 11  --  standing hip abd, B  -GJ     Cueing 11  --  Verbal;Demo  -GJ     Reps 11  --  12  -GJ     Additional Comments  --  2#  -GJ        Exercise 12    Exercise Name 12  --  standing Row  -GJ     Cueing 12  --  Verbal;Demo  -GJ     Reps 12  --  20  -GJ     Time 12  --  GTB  -GJ     Additional Comments  --  feet under shoulder  -GJ        Exercise 13    Exercise Name 13  --  tandem stance, both feet in front  -GJ     Cueing 13  --  Verbal;Demo  -GJ     Reps 13  --  3  -GJ     Time 13  --  20 s, each   -GJ        Exercise 15    Exercise Name 15  --  Sidestepping 1 UE support  -GJ     Cueing 15  --  Verbal;Demo  -GJ     Reps 15  --  3 laps  -GJ     Additional Comments  --  RTB  -GJ       User Key  (r) = Recorded By, (t) = Taken By, (c) = Cosigned By    Initials Name Provider Type    GJ Rashawn Jeffrey, PT Physical Therapist                       PT OP Goals     Row Name 03/04/20 0900          PT Short Term Goals    STG Date to Achieve  02/28/20  -GJ     STG 1  pt. to be I with initial HEP to facilitate self management of their condition  -GJ     STG 1 Progress  Met  -GJ     STG 2  pt. to be educated in/verbalize understanding of the importance of posture/ergonomics in association with their condition to facilitate self management of  their condition  -GJ     STG 2 Progress  Met  -GJ     STG 3  pt to complete Oswestry score questionaire to facilitate appropriate progression of treatments  -GJ     STG 3 Progress  Met  -GJ        Long Term Goals    LTG Date to Achieve  04/24/20  -GJ     LTG 1  pt. to be I with advanced HEP to facilitate self management of their condition  -GJ     LTG 1 Progress  Ongoing;Progressing  -GJ     LTG 2  pt. to report an Oswestry score >/= 26% demonstrate decreased level of perceived disability  -GJ     LTG 2 Progress  Ongoing  -GJ     LTG 2 Progress Comments  44%, up from 36%, not an MCID  -GJ     LTG 3  pt. to ascend/descends stairs with reciprocal pattern (</= 1 rails) to facilitate ease/safety of household/community mobility  -GJ     LTG 3 Progress  Ongoing  -GJ     LTG 3 Progress Comments  ascends reciprocally with 2 rails, descends step to, with bilateral rails.   -GJ     LTG 4  pt to report >/= 50% improvement in her condition to facilitate ease/safety of going on cruise  -GJ     LTG 4 Progress  Met  -GJ       User Key  (r) = Recorded By, (t) = Taken By, (c) = Cosigned By    Initials Name Provider Type     Rashawn Jeffrey, PT Physical Therapist          Therapy Education  Given: HEP, Symptoms/condition management, Pain management, Mobility training, Posture/body mechanics, Fall prevention and home safety  Program: New, Reinforced  How Provided: Verbal, Demonstration  Provided to: Patient  Level of Understanding: Teach back education performed, Verbalized, Demonstrated    Outcome Measure Options: Modifed Owestry  Modified Oswestry  Modified Oswestry Score/Comments: 44%      Time Calculation:   Start Time: 0910  Stop Time: 0958  Time Calculation (min): 48 min  Therapy Charges for Today     Code Description Service Date Service Provider Modifiers Qty    17220043030 HC PT THER PROC EA 15 MIN 3/4/2020 Rashawn Jeffrey, PT GP 3          PT G-Codes  Outcome Measure Options: Modifed Owestry  Modified Oswestry  Score/Comments: 44%         Rashawn Jeffrey, PT  3/4/2020

## 2020-03-19 ENCOUNTER — APPOINTMENT (OUTPATIENT)
Dept: PHYSICAL THERAPY | Facility: HOSPITAL | Age: 60
End: 2020-03-19

## 2020-10-05 RX ORDER — LISINOPRIL 30 MG/1
TABLET ORAL
Qty: 30 TABLET | Refills: 0 | Status: SHIPPED | OUTPATIENT
Start: 2020-10-05 | End: 2020-11-09

## 2020-11-09 RX ORDER — LISINOPRIL 30 MG/1
TABLET ORAL
Qty: 30 TABLET | Refills: 3 | Status: SHIPPED | OUTPATIENT
Start: 2020-11-09 | End: 2021-02-22

## 2021-02-22 RX ORDER — LISINOPRIL 30 MG/1
TABLET ORAL
Qty: 30 TABLET | Refills: 4 | Status: SHIPPED | OUTPATIENT
Start: 2021-02-22 | End: 2021-07-27

## 2021-03-24 ENCOUNTER — BULK ORDERING (OUTPATIENT)
Dept: CASE MANAGEMENT | Facility: OTHER | Age: 61
End: 2021-03-24

## 2021-03-24 DIAGNOSIS — Z23 IMMUNIZATION DUE: ICD-10-CM

## 2021-07-27 DIAGNOSIS — I10 ESSENTIAL HYPERTENSION: Primary | ICD-10-CM

## 2021-07-27 RX ORDER — LISINOPRIL 30 MG/1
TABLET ORAL
Qty: 30 TABLET | Refills: 2 | Status: SHIPPED | OUTPATIENT
Start: 2021-07-27 | End: 2021-11-02

## 2021-11-02 DIAGNOSIS — I10 ESSENTIAL HYPERTENSION: ICD-10-CM

## 2021-11-02 RX ORDER — LISINOPRIL 30 MG/1
TABLET ORAL
Qty: 30 TABLET | Refills: 0 | Status: SHIPPED | OUTPATIENT
Start: 2021-11-02 | End: 2021-11-30

## 2021-11-30 DIAGNOSIS — I10 ESSENTIAL HYPERTENSION: ICD-10-CM

## 2021-11-30 RX ORDER — LISINOPRIL 30 MG/1
TABLET ORAL
Qty: 30 TABLET | Refills: 0 | Status: SHIPPED | OUTPATIENT
Start: 2021-11-30 | End: 2021-12-13 | Stop reason: SDUPTHER

## 2021-12-13 ENCOUNTER — OFFICE VISIT (OUTPATIENT)
Dept: INTERNAL MEDICINE | Facility: CLINIC | Age: 61
End: 2021-12-13

## 2021-12-13 VITALS
HEART RATE: 90 BPM | OXYGEN SATURATION: 97 % | WEIGHT: 262 LBS | TEMPERATURE: 98.4 F | SYSTOLIC BLOOD PRESSURE: 130 MMHG | BODY MASS INDEX: 42.11 KG/M2 | HEIGHT: 66 IN | DIASTOLIC BLOOD PRESSURE: 82 MMHG

## 2021-12-13 DIAGNOSIS — I10 PRIMARY HYPERTENSION: ICD-10-CM

## 2021-12-13 DIAGNOSIS — Z76.89 ENCOUNTER TO ESTABLISH CARE: Primary | ICD-10-CM

## 2021-12-13 PROBLEM — Z85.828 HISTORY OF BASAL CELL CARCINOMA (BCC) OF SKIN: Status: ACTIVE | Noted: 2021-12-13

## 2021-12-13 PROCEDURE — 99213 OFFICE O/P EST LOW 20 MIN: CPT | Performed by: FAMILY MEDICINE

## 2021-12-13 RX ORDER — LISINOPRIL 30 MG/1
30 TABLET ORAL DAILY
Qty: 90 TABLET | Refills: 1 | Status: SHIPPED | OUTPATIENT
Start: 2021-12-13 | End: 2022-06-20

## 2021-12-13 NOTE — PROGRESS NOTES
"Chief Complaint  Establish Care, Hypertension, and Knee Pain (Bilateral)    Subjective    History of Present Illness {CC  Problem List  Visit  Diagnosis   Encounters  Notes  Medications  Labs  Result Review Imaging  Media :23}     Breana Arreola presents to [facility] for   History of Present Illness   62 yo female present to establish care. Pt is new to me, previously seeing Dr. Rao.  She has a history of hypertension, vitamin D def and elevated blood sugar.   Pt has had blood work with physical for work.    Pt states having some issues with  Knee pain. She fell over a year ago, ankle fracture  Hip pain, and back pain previously. Has completed physical therapy March 2020 and steroid for pain. Still has issue with pain in ankle and hip.    Aleve as needed for pain.   Basal cell cancer in past, removed 10 years ago.       Social History     Socioeconomic History   • Marital status:    Tobacco Use   • Smoking status: Never Smoker   • Smokeless tobacco: Never Used   Substance and Sexual Activity   • Alcohol use: Yes     Comment: Moderate weekend   • Drug use: No       Objective     Vital Signs:   /82 (BP Location: Left arm, Patient Position: Sitting, Cuff Size: Adult)   Pulse 90   Temp 98.4 °F (36.9 °C) (Temporal)   Ht 167.6 cm (66\")   Wt 119 kg (262 lb)   SpO2 97%   BMI 42.29 kg/m²   Physical Exam  Vitals reviewed.   Constitutional:       General: She is not in acute distress.     Appearance: She is not ill-appearing.   HENT:      Head: Normocephalic.   Eyes:      Conjunctiva/sclera: Conjunctivae normal.   Cardiovascular:      Rate and Rhythm: Regular rhythm.      Heart sounds: Normal heart sounds.   Pulmonary:      Effort: Pulmonary effort is normal. No respiratory distress.      Breath sounds: Normal breath sounds. No wheezing.   Musculoskeletal:      Right lower leg: No edema.      Left lower leg: No edema.   Neurological:      Mental Status: She is " alert.   Psychiatric:         Mood and Affect: Mood normal.        Result Review  Data Reviewed:{ Labs  Result Review  Imaging  Med Tab  Media :23}   The following data was reviewed by: Sharona Kirkland MD on 12/13/2021  No results for input(s): GLU, BUN, CREATININE, EGFRIFNONA, EGFRIFAFRI, NA, K, CL, CALCIUM, ALBUMIN, BILITOT, ALKPHOS, AST, ALT, CHLPL, TRIG, HDL, VLDL, LDL, LDLHDL, CKTOTAL, HGBA1C, MICROALBUR, WBC, RBC, HB, HCT, MCV, MCH, TSH, FREET4, PSA, INR, ZVOC31MJ, URICACID in the last 36899 hours.    Invalid input(s): PROTEINTOTREF, PLATELETS         Current Outpatient Medications:   •  Cholecalciferol (VITAMIN D3) 2000 UNITS tablet, Take  by mouth daily., Disp: , Rfl:   •  Coenzyme Q10-Vitamin E 100-300 MG-UNIT wafer, Take  by mouth., Disp: , Rfl:   •  Collagen Hydrolysate powder, , Disp: , Rfl:   •  lisinopril (PRINIVIL,ZESTRIL) 30 MG tablet, Take 1 tablet by mouth Daily., Disp: 90 tablet, Rfl: 1  •  Magnesium 100 MG tablet, Take  by mouth., Disp: , Rfl:   •  Multiple Vitamins-Minerals (ALIVE ONCE DAILY WOMENS 50+) tablet, Take  by mouth., Disp: , Rfl:   •  Omega-3 Fatty Acids (FISH OIL) 1000 MG capsule capsule, Take 2 capsules by mouth daily., Disp: , Rfl:   •  Probiotic capsule, Take  by mouth daily., Disp: , Rfl:   •  vitamin B-12 (CYANOCOBALAMIN) 100 MCG tablet, Take 50 mcg by mouth daily., Disp: , Rfl:      Assessment and Plan {CC Problem List  Visit Diagnosis  ROS  Review (Popup)  Health Maintenance  Quality  BestPractice  Medications  SmartSets  SnapShot Encounters  Media :23}   Problem List Items Addressed This Visit        Cardiac and Vasculature    Hypertension    Current Assessment & Plan     Hypertension is chronic .  Continue current treatment regimen.  Dietary sodium restriction.  Continue current medications.  Ambulatory blood pressure monitoring.  Blood pressure will be reassessed at the next regular appointment.         Relevant Medications    lisinopril (PRINIVIL,ZESTRIL)  30 MG tablet      Other Visit Diagnoses     Encounter to establish care    -  Primary        Mammo and gyn pt states she will make appt to complete these.      Follow Up   Return in about 5 weeks (around 1/18/2022) for Annual physical, fasting labs.  Patient was given instructions and counseling regarding her condition or for health maintenance advice. Please see specific information pulled into the AVS if appropriate.    EpicAct:[email or web address]_ORDERS,RunParams:STARTUPTYPE=FOLLOW    [email or web address]_DISCHARGE

## 2021-12-14 NOTE — ASSESSMENT & PLAN NOTE
Hypertension is chronic .  Continue current treatment regimen.  Dietary sodium restriction.  Continue current medications.  Ambulatory blood pressure monitoring.  Blood pressure will be reassessed at the next regular appointment.

## 2022-03-17 ENCOUNTER — OFFICE VISIT (OUTPATIENT)
Dept: INTERNAL MEDICINE | Facility: CLINIC | Age: 62
End: 2022-03-17

## 2022-03-17 VITALS
SYSTOLIC BLOOD PRESSURE: 124 MMHG | HEART RATE: 75 BPM | OXYGEN SATURATION: 98 % | WEIGHT: 251 LBS | HEIGHT: 66 IN | BODY MASS INDEX: 40.34 KG/M2 | TEMPERATURE: 98.1 F | DIASTOLIC BLOOD PRESSURE: 80 MMHG

## 2022-03-17 DIAGNOSIS — Z11.59 ENCOUNTER FOR HEPATITIS C SCREENING TEST FOR LOW RISK PATIENT: ICD-10-CM

## 2022-03-17 DIAGNOSIS — Z13.220 SCREENING FOR CHOLESTEROL LEVEL: ICD-10-CM

## 2022-03-17 DIAGNOSIS — M25.552 LEFT HIP PAIN: ICD-10-CM

## 2022-03-17 DIAGNOSIS — Z00.00 ROUTINE GENERAL MEDICAL EXAMINATION AT A HEALTH CARE FACILITY: Primary | ICD-10-CM

## 2022-03-17 DIAGNOSIS — Z12.31 ENCOUNTER FOR SCREENING MAMMOGRAM FOR MALIGNANT NEOPLASM OF BREAST: ICD-10-CM

## 2022-03-17 DIAGNOSIS — I10 PRIMARY HYPERTENSION: ICD-10-CM

## 2022-03-17 DIAGNOSIS — E55.9 VITAMIN D DEFICIENCY: ICD-10-CM

## 2022-03-17 DIAGNOSIS — Z23 NEED FOR TDAP VACCINATION: ICD-10-CM

## 2022-03-17 PROCEDURE — 99396 PREV VISIT EST AGE 40-64: CPT | Performed by: FAMILY MEDICINE

## 2022-03-17 PROCEDURE — 90471 IMMUNIZATION ADMIN: CPT | Performed by: FAMILY MEDICINE

## 2022-03-17 PROCEDURE — 90715 TDAP VACCINE 7 YRS/> IM: CPT | Performed by: FAMILY MEDICINE

## 2022-03-17 RX ORDER — MELOXICAM 7.5 MG/1
7.5 TABLET ORAL DAILY
Qty: 30 TABLET | Refills: 0 | Status: SHIPPED | OUTPATIENT
Start: 2022-03-17 | End: 2022-09-01

## 2022-03-17 NOTE — PROGRESS NOTES
"Chief Complaint  Annual Exam, Ankle Pain (Right ankle pain), and Hip Pain (Left hip and leg pain)    Subjective     {Problem List  Visit Diagnosis   Encounters  Notes  Medications  Labs  Result Review Imaging  Media :23}       Breana Arreola presents to [facility]  History of Present Illness    CPE- Patient reporting that health is doing well overall.  Patient is here today for annual physical.  Trying to eat a balanced diet. States she may have had a episode with gout in ankle.  Hip, knee and ankle pain.  Worrying about how far she can walk and going up steps.     Labs: Due for routine labs  Immunization- tdap, shingles  Colorectal cancer screening: up to date  Breast cancer screening: due  Cervical cancer screening: due     PHQ-2 Depression Screening  Little interest or pleasure in doing things? 0-->not at all   Feeling down, depressed, or hopeless? 0-->not at all   PHQ-2 Total Score 0             Review of Systems   Constitutional: Negative for fever.   HENT: Negative for congestion, rhinorrhea and sneezing.    Eyes: Negative for visual disturbance.   Respiratory: Negative for cough, chest tightness, shortness of breath and wheezing.    Cardiovascular: Negative for chest pain and leg swelling.   Gastrointestinal: Negative for abdominal distention, constipation, diarrhea and vomiting.   Genitourinary: Negative for difficulty urinating, vaginal bleeding and vaginal discharge.   Musculoskeletal: Positive for arthralgias.        Hips, knee and ankle   Skin: Negative for rash.   Neurological: Negative for dizziness and headaches.   Hematological: Bruises/bleeds easily.   Psychiatric/Behavioral: Positive for sleep disturbance. Negative for agitation and decreased concentration.         Objective   Vital Signs:   /80   Pulse 75   Temp 98.1 °F (36.7 °C)   Ht 167.6 cm (66\")   Wt 114 kg (251 lb)   SpO2 98%   BMI 40.51 kg/m²     Physical Exam  Constitutional:       General: " She is not in acute distress.     Appearance: She is obese. She is not ill-appearing.   HENT:      Head: Normocephalic.      Right Ear: Tympanic membrane normal.      Left Ear: Tympanic membrane normal.      Mouth/Throat:      Mouth: Mucous membranes are moist.      Pharynx: Oropharynx is clear. No oropharyngeal exudate.   Eyes:      Conjunctiva/sclera: Conjunctivae normal.   Cardiovascular:      Rate and Rhythm: Regular rhythm.      Heart sounds: Normal heart sounds.   Pulmonary:      Effort: No respiratory distress.      Breath sounds: Normal breath sounds. No wheezing.   Abdominal:      General: Bowel sounds are normal. There is no distension.      Palpations: Abdomen is soft.      Tenderness: There is no abdominal tenderness.   Musculoskeletal:      Right lower leg: No edema.      Left lower leg: No edema.      Comments: Trace     Skin:     General: Skin is warm and dry.   Neurological:      Mental Status: She is alert.   Psychiatric:         Mood and Affect: Mood normal.         Thought Content: Thought content normal.         Judgment: Judgment normal.          Unable to access hip movement, pt could not get on exam table.   Walking with a can    Result Review :   The following data was reviewed by: Sharona Kirkland MD on 03/17/2022:             Current Outpatient Medications:   •  Cholecalciferol (VITAMIN D3) 2000 UNITS tablet, Take  by mouth daily., Disp: , Rfl:   •  Coenzyme Q10-Vitamin E 100-300 MG-UNIT wafer, Take  by mouth., Disp: , Rfl:   •  Collagen Hydrolysate powder, , Disp: , Rfl:   •  lisinopril (PRINIVIL,ZESTRIL) 30 MG tablet, Take 1 tablet by mouth Daily., Disp: 90 tablet, Rfl: 1  •  Magnesium 100 MG tablet, Take  by mouth., Disp: , Rfl:   •  Multiple Vitamins-Minerals (ALIVE ONCE DAILY WOMENS 50+) tablet, Take  by mouth., Disp: , Rfl:   •  Omega-3 Fatty Acids (FISH OIL) 1000 MG capsule capsule, Take 2 capsules by mouth daily., Disp: , Rfl:   •  Probiotic capsule, Take  by mouth daily., Disp: ,  Rfl:   •  vitamin B-12 (CYANOCOBALAMIN) 100 MCG tablet, Take 50 mcg by mouth daily., Disp: , Rfl:   •  meloxicam (Mobic) 7.5 MG tablet, Take 1 tablet by mouth Daily., Disp: 30 tablet, Rfl: 0     Assessment and Plan    Diagnoses and all orders for this visit:    1. Routine general medical examination at a health care facility (Primary)  -     Hepatitis C antibody  -     Comprehensive metabolic panel  -     CBC and Differential  -     TSH  -     Urinalysis without microscopic (no culture) - Urine, Clean Catch  -     Mammo Screening, Bilateral (Annually); Future  -     Tdap vaccine greater than or equal to 8yo IM  -     Lipid panel    2. Primary hypertension  Assessment & Plan:  Hypertension is chronic, stable.  Continue current treatment regimen.  Dietary sodium restriction.  Continue current medications.  Ambulatory blood pressure monitoring.  Blood pressure will be reassessed at the next regular appointment.    Orders:  -     Comprehensive metabolic panel  -     CBC and Differential    3. Vitamin D deficiency  -     Vitamin D 25 Hydroxy    4. Encounter for hepatitis C screening test for low risk patient  -     Hepatitis C antibody    5. Screening for cholesterol level  -     Lipid panel    6. Encounter for screening mammogram for malignant neoplasm of breast  -     Mammo Screening, Bilateral (Annually); Future    7. Need for Tdap vaccination  -     Tdap vaccine greater than or equal to 8yo IM    8. Left hip pain  -     meloxicam (Mobic) 7.5 MG tablet; Take 1 tablet by mouth Daily.  Dispense: 30 tablet; Refill: 0  -     XR Hip With or Without Pelvis 2 - 3 View Left; Future    Handicap form completed, pt increase pain. Xray pending. She has had PT for her back a few years ago.  Mobic for pain  Once xray obtain discuss plan.          The patient was counseled regarding nutrition, physical activity, healthy weight, injury prevention, misuse of tobacco, alcohol and illicit drugs, mental health, immunizations, and  screenings.      Follow Up   Return in about 6 months (around 9/17/2022) for Recheck.  Patient was given instructions and counseling regarding her condition or for health maintenance advice. Please see specific information pulled into the AVS if appropriate.

## 2022-03-17 NOTE — ASSESSMENT & PLAN NOTE
Hypertension is chronic, stable.  Continue current treatment regimen.  Dietary sodium restriction.  Continue current medications.  Ambulatory blood pressure monitoring.  Blood pressure will be reassessed at the next regular appointment.

## 2022-03-28 ENCOUNTER — TELEPHONE (OUTPATIENT)
Dept: INTERNAL MEDICINE | Facility: CLINIC | Age: 62
End: 2022-03-28

## 2022-03-28 NOTE — TELEPHONE ENCOUNTER
Caller: Breana Arreola    Relationship to patient: Self    Best call back number: [phone]    Patient is needing: PATIENT IS NOT FEELING WELL AND NEEDS TO RESCHEDULE HER LABS THAT SHE HAS 3-29-22, PLEASE CALL PATIENT TO RESCHEDULE.

## 2022-04-19 ENCOUNTER — HOSPITAL ENCOUNTER (OUTPATIENT)
Dept: GENERAL RADIOLOGY | Facility: HOSPITAL | Age: 62
Discharge: HOME | End: 2022-04-19
Admitting: FAMILY MEDICINE

## 2022-04-19 DIAGNOSIS — M25.552 LEFT HIP PAIN: ICD-10-CM

## 2022-04-19 PROCEDURE — 73502 X-RAY EXAM HIP UNI 2-3 VIEWS: CPT

## 2022-04-20 LAB
25(OH)D3+25(OH)D2 SERPL-MCNC: 31.5 NG/ML (ref 30–100)
ALBUMIN SERPL-MCNC: 4.3 G/DL (ref 3.8–4.9)
ALBUMIN/GLOB SERPL: 1.5 {RATIO} (ref 1.2–2.2)
ALP SERPL-CCNC: 129 IU/L (ref 44–121)
ALT SERPL-CCNC: 19 IU/L (ref 0–32)
APPEARANCE UR: ABNORMAL
AST SERPL-CCNC: 23 IU/L (ref 0–40)
BASOPHILS # BLD AUTO: 0 X10E3/UL (ref 0–0.2)
BASOPHILS NFR BLD AUTO: 1 %
BILIRUB SERPL-MCNC: 0.3 MG/DL (ref 0–1.2)
BILIRUB UR QL STRIP: NEGATIVE
BUN SERPL-MCNC: 14 MG/DL (ref 6–24)
BUN/CREAT SERPL: 20 (ref 9–23)
CALCIUM SERPL-MCNC: 9.4 MG/DL (ref 8.7–10.2)
CHLORIDE SERPL-SCNC: 101 MMOL/L (ref 96–106)
CHOLEST SERPL-MCNC: 194 MG/DL (ref 100–199)
CO2 SERPL-SCNC: 23 MMOL/L (ref 20–29)
COLOR UR: YELLOW
CREAT SERPL-MCNC: 0.7 MG/DL (ref 0.57–1)
EGFRCR SERPLBLD CKD-EPI 2021: 100 ML/MIN/1.73
EOSINOPHIL # BLD AUTO: 0.2 X10E3/UL (ref 0–0.4)
EOSINOPHIL NFR BLD AUTO: 2 %
ERYTHROCYTE [DISTWIDTH] IN BLOOD BY AUTOMATED COUNT: 12.5 % (ref 11.7–15.4)
GLOBULIN SER CALC-MCNC: 2.8 G/DL (ref 1.5–4.5)
GLUCOSE SERPL-MCNC: 107 MG/DL (ref 65–99)
GLUCOSE UR QL STRIP: NEGATIVE
HCT VFR BLD AUTO: 41.5 % (ref 34–46.6)
HCV AB S/CO SERPL IA: <0.1 S/CO RATIO (ref 0–0.9)
HDLC SERPL-MCNC: 62 MG/DL
HGB BLD-MCNC: 13.8 G/DL (ref 11.1–15.9)
HGB UR QL STRIP: NEGATIVE
IMM GRANULOCYTES # BLD AUTO: 0 X10E3/UL (ref 0–0.1)
IMM GRANULOCYTES NFR BLD AUTO: 0 %
KETONES UR QL STRIP: NEGATIVE
LDLC SERPL CALC-MCNC: 113 MG/DL (ref 0–99)
LEUKOCYTE ESTERASE UR QL STRIP: NEGATIVE
LYMPHOCYTES # BLD AUTO: 1.1 X10E3/UL (ref 0.7–3.1)
LYMPHOCYTES NFR BLD AUTO: 19 %
MCH RBC QN AUTO: 31.2 PG (ref 26.6–33)
MCHC RBC AUTO-ENTMCNC: 33.3 G/DL (ref 31.5–35.7)
MCV RBC AUTO: 94 FL (ref 79–97)
MONOCYTES # BLD AUTO: 0.6 X10E3/UL (ref 0.1–0.9)
MONOCYTES NFR BLD AUTO: 10 %
NEUTROPHILS # BLD AUTO: 4.2 X10E3/UL (ref 1.4–7)
NEUTROPHILS NFR BLD AUTO: 68 %
NITRITE UR QL STRIP: NEGATIVE
PH UR STRIP: 8 [PH] (ref 5–7.5)
PLATELET # BLD AUTO: 355 X10E3/UL (ref 150–450)
POTASSIUM SERPL-SCNC: 4.6 MMOL/L (ref 3.5–5.2)
PROT SERPL-MCNC: 7.1 G/DL (ref 6–8.5)
PROT UR QL STRIP: ABNORMAL
RBC # BLD AUTO: 4.42 X10E6/UL (ref 3.77–5.28)
SODIUM SERPL-SCNC: 139 MMOL/L (ref 134–144)
SP GR UR STRIP: 1.02 (ref 1–1.03)
TRIGL SERPL-MCNC: 105 MG/DL (ref 0–149)
TSH SERPL DL<=0.005 MIU/L-ACNC: 1.82 UIU/ML (ref 0.45–4.5)
UROBILINOGEN UR STRIP-MCNC: 0.2 MG/DL (ref 0.2–1)
VLDLC SERPL CALC-MCNC: 19 MG/DL (ref 5–40)
WBC # BLD AUTO: 6.1 X10E3/UL (ref 3.4–10.8)

## 2022-06-19 DIAGNOSIS — I10 PRIMARY HYPERTENSION: ICD-10-CM

## 2022-06-20 RX ORDER — LISINOPRIL 30 MG/1
30 TABLET ORAL DAILY
Qty: 90 TABLET | Refills: 0 | Status: SHIPPED | OUTPATIENT
Start: 2022-06-20 | End: 2022-09-07

## 2022-06-21 ENCOUNTER — OFFICE VISIT (OUTPATIENT)
Dept: ORTHOPEDIC SURGERY | Facility: CLINIC | Age: 62
End: 2022-06-21

## 2022-06-21 VITALS — WEIGHT: 262 LBS | BODY MASS INDEX: 42.11 KG/M2 | HEIGHT: 66 IN | TEMPERATURE: 96.9 F

## 2022-06-21 DIAGNOSIS — M16.12 PRIMARY OSTEOARTHRITIS OF LEFT HIP: Primary | ICD-10-CM

## 2022-06-21 PROCEDURE — 99204 OFFICE O/P NEW MOD 45 MIN: CPT | Performed by: ORTHOPAEDIC SURGERY

## 2022-06-21 RX ORDER — POVIDONE-IODINE 10 MG/ML
SOLUTION TOPICAL ONCE
Status: CANCELLED | OUTPATIENT
Start: 2022-09-12 | End: 2022-06-21

## 2022-06-21 RX ORDER — CEFAZOLIN SODIUM 2 G/100ML
2 INJECTION, SOLUTION INTRAVENOUS ONCE
Status: CANCELLED | OUTPATIENT
Start: 2022-09-12 | End: 2022-06-21

## 2022-06-21 RX ORDER — PREGABALIN 75 MG/1
150 CAPSULE ORAL ONCE
Status: CANCELLED | OUTPATIENT
Start: 2022-09-12 | End: 2022-06-21

## 2022-06-21 RX ORDER — MELOXICAM 15 MG/1
15 TABLET ORAL ONCE
Status: CANCELLED | OUTPATIENT
Start: 2022-09-12 | End: 2022-06-21

## 2022-06-21 RX ORDER — CHLORHEXIDINE GLUCONATE 500 MG/1
CLOTH TOPICAL 2 TIMES DAILY
Status: CANCELLED | OUTPATIENT
Start: 2022-06-21

## 2022-06-21 NOTE — PROGRESS NOTES
"Patient: Breana Arreola  YOB: 1960 61 y.o. female  Medical Record Number: [id number]    Chief Complaints:   Chief Complaint   Patient presents with   • Left Hip - Establish Care, Pain       History of Present Illness:Breana Arreola is a 61 y.o. female who presents with complaints of severe left hip pain markedly progressed over the last year.  She is now using a cane.  She cannot get shoes and socks on or perform basic activities of daily living.  She can only walk very short distances.    Allergies: No Known Allergies    Medications:   Current Outpatient Medications   Medication Sig Dispense Refill   • lisinopril (PRINIVIL,ZESTRIL) 30 MG tablet TAKE 1 TABLET BY MOUTH DAILY 90 tablet 0   • Cholecalciferol (VITAMIN D3) 2000 UNITS tablet Take  by mouth daily.     • Coenzyme Q10-Vitamin E 100-300 MG-UNIT wafer Take  by mouth.     • Collagen Hydrolysate powder      • Magnesium 100 MG tablet Take  by mouth.     • meloxicam (Mobic) 7.5 MG tablet Take 1 tablet by mouth Daily. 30 tablet 0   • Multiple Vitamins-Minerals (ALIVE ONCE DAILY WOMENS 50+) tablet Take  by mouth.     • Omega-3 Fatty Acids (FISH OIL) 1000 MG capsule capsule Take 2 capsules by mouth daily.     • Probiotic capsule Take  by mouth daily.     • vitamin B-12 (CYANOCOBALAMIN) 100 MCG tablet Take 50 mcg by mouth daily.       No current facility-administered medications for this visit.         The following portions of the patient's history were reviewed and updated as appropriate: allergies, current medications, past family history, past medical history, past social history, past surgical history and problem list.    Review of Systems:   A 14 point review of systems was performed. All systems negative except pertinent positives/negative listed in HPI above    Physical Exam:   Vitals:    06/21/22 1417   Temp: 96.9 °F (36.1 °C)   Weight: 119 kg (262 lb)   Height: 167.6 cm (66\")       General: A and O x 3, ASA, NAD    SCLERA:    " Normal    DENTITION:   Normal  Hip:  left    LEG ALIGNMENT:     Neutral        LEG LENGTH DISCREPANCY   :    left longer by 0.5 cm    GAIT:     Antalgic    SKIN:     No abnormality    RANGE OF MOTION:     Limited by joint irritability    STRENGTH:     Limited by joint irratibility    DISTAL PULSES:    Paplable    DISTAL SENSATION :   Intact    LYMPHATICS:     No   lymphadenopathy    OTHER:          +   Stinchfeld test      -    log roll      -   Tenderness to palpation trochanteric bursa        Radiology:  Xrays 2views left hip (AP bilateral hips, and lateral of the hip) taken at an [facility] institution were reviewed  demonstrating  advanced, end-satge osteoarthritis with bone on bone articluation, periarticular osteophytes, and subchondral cysts    Assessment/Plan: left hip endstage OA.  Continuation of conservative management vs. CELSO discussed.  The patient wishes to proceed with total hip replacement.  At this point the patient has failed the full gamut of conservative treatment and stating complete understanding of the risks/benefits/ anternatives wishes to proceed with surgical treatment.    Risk and benefits of surgery were reviewed.  Including, but not limited to, blood clots, anesthesia risk, infection, leg length discrepancy, fracture, skin/leg numbness, failure of the implant, need for future surgeries, continued pain, hematoma, need for transfusion, and death, among others.  The patient understands and wishes to proceed.     The spectrum of treatment options were discussed with the patient in detail including both the nonoperative and operative treatment modalities and their respective risks and benefits.  After thorough discussion, the patient has elected to undergo surgical treatment.  The details of the surgical procedure were explained including the location of probable incisions and a description of the likely implants to be used.  Models and diagrams were used as educational resources. The patient  understands the likely convalescence after surgery, as well as the rehabilitation required.  We thoroughly discussed the risks, benefits, and alternatives to surgery.  The risks include but are not limited to the risk of infection, joint stiffness, blood clots (including DVT and/or pulmonary embolus along with the risk of death), neurologic and/or vascular injury, fracture, dislocation, nonunion, malunion, need for further surgery including hardware failure requiring revision, and continued pain.  It was explained that if tissue has been repaired or reconstructed, there is also a chance of failure which may require further management.  Following the completion of the discussion, the patient expressed understanding of this planned course of care, all their questions were answered and consent will be obtained preoperatively.    Operative Plan: left Posterior approach Total Hip Replacement 23 HR STAY - would take extra measures to prevent complications given her obesity - - Irrisept, omnicef 300 mg bid x 10 days, possibly dual mobility.       Michelet Peng MD  6/21/2022

## 2022-09-01 ENCOUNTER — PRE-ADMISSION TESTING (OUTPATIENT)
Dept: PREADMISSION TESTING | Facility: HOSPITAL | Age: 62
End: 2022-09-01

## 2022-09-01 VITALS
HEIGHT: 66 IN | SYSTOLIC BLOOD PRESSURE: 157 MMHG | BODY MASS INDEX: 41.42 KG/M2 | HEART RATE: 86 BPM | DIASTOLIC BLOOD PRESSURE: 89 MMHG | OXYGEN SATURATION: 98 % | TEMPERATURE: 97.7 F | RESPIRATION RATE: 16 BRPM | WEIGHT: 257.7 LBS

## 2022-09-01 LAB
ANION GAP SERPL CALCULATED.3IONS-SCNC: 9.4 MMOL/L (ref 5–15)
BUN SERPL-MCNC: 12 MG/DL (ref 8–23)
BUN/CREAT SERPL: 18.2 (ref 7–25)
CALCIUM SPEC-SCNC: 9 MG/DL (ref 8.6–10.5)
CHLORIDE SERPL-SCNC: 107 MMOL/L (ref 98–107)
CO2 SERPL-SCNC: 24.6 MMOL/L (ref 22–29)
CREAT SERPL-MCNC: 0.66 MG/DL (ref 0.57–1)
DEPRECATED RDW RBC AUTO: 41.9 FL (ref 37–54)
EGFRCR SERPLBLD CKD-EPI 2021: 100.6 ML/MIN/1.73
ERYTHROCYTE [DISTWIDTH] IN BLOOD BY AUTOMATED COUNT: 12.5 % (ref 12.3–15.4)
GLUCOSE SERPL-MCNC: 109 MG/DL (ref 65–99)
HCT VFR BLD AUTO: 39.2 % (ref 34–46.6)
HGB BLD-MCNC: 13.3 G/DL (ref 12–15.9)
MCH RBC QN AUTO: 31.1 PG (ref 26.6–33)
MCHC RBC AUTO-ENTMCNC: 33.9 G/DL (ref 31.5–35.7)
MCV RBC AUTO: 91.8 FL (ref 79–97)
PLATELET # BLD AUTO: 318 10*3/MM3 (ref 140–450)
PMV BLD AUTO: 10.1 FL (ref 6–12)
POTASSIUM SERPL-SCNC: 5 MMOL/L (ref 3.5–5.2)
QT INTERVAL: 367 MS
RBC # BLD AUTO: 4.27 10*6/MM3 (ref 3.77–5.28)
SODIUM SERPL-SCNC: 141 MMOL/L (ref 136–145)
WBC NRBC COR # BLD: 6.04 10*3/MM3 (ref 3.4–10.8)

## 2022-09-01 PROCEDURE — 93010 ELECTROCARDIOGRAM REPORT: CPT | Performed by: INTERNAL MEDICINE

## 2022-09-01 PROCEDURE — 36415 COLL VENOUS BLD VENIPUNCTURE: CPT

## 2022-09-01 PROCEDURE — 85027 COMPLETE CBC AUTOMATED: CPT

## 2022-09-01 PROCEDURE — 93005 ELECTROCARDIOGRAM TRACING: CPT

## 2022-09-01 PROCEDURE — 80048 BASIC METABOLIC PNL TOTAL CA: CPT

## 2022-09-01 RX ORDER — SENNOSIDES 8.6 MG
650 CAPSULE ORAL EVERY 8 HOURS PRN
COMMUNITY
End: 2022-09-13

## 2022-09-01 ASSESSMENT — HOOS JR
HOOS JR SCORE: 15
HOOS JR SCORE: 50.012

## 2022-09-01 NOTE — DISCHARGE INSTRUCTIONS
Take the following medications the morning of surgery:  NONE        ARRIVE 6:00 AM 9/12 PER MD INSTRUCTIONS      If you are on prescription narcotic pain medication to control your pain you may also take that medication the morning of surgery.    General Instructions:  Do not eat solid food after midnight the night before surgery.  You may drink clear liquids day of surgery but must stop at least one hour before your hospital arrival time.  It is beneficial for you to have a clear drink that contains carbohydrates the day of surgery.  We suggest a 12 to 20 ounce bottle of Gatorade or Powerade for non-diabetic patients or a 12 to 20 ounce bottle of G2 or Powerade Zero for diabetic patients. (Pediatric patients, are not advised to drink a 12 to 20 ounce carbohydrate drink)    Clear liquids are liquids you can see through.  Nothing red in color.     Plain water                               Sports drinks  Sodas                                   Gelatin (Jell-O)  Fruit juices without pulp such as white grape juice and apple juice  Popsicles that contain no fruit or yogurt  Tea or coffee (no cream or milk added)  Gatorade / Powerade  G2 / Powerade Zero    Infants may have breast milk up to four hours before surgery.  Infants drinking formula may drink formula up to six hours before surgery.   Patients who avoid smoking, chewing tobacco and alcohol for 4 weeks prior to surgery have a reduced risk of post-operative complications.  Quit smoking as many days before surgery as you can.  Do not smoke, use chewing tobacco or drink alcohol the day of surgery.   If applicable bring your C-PAP/ BI-PAP machine.  Bring any papers given to you in the doctor’s office.  Wear clean comfortable clothes.  Do not wear contact lenses, false eyelashes or make-up.  Bring a case for your glasses.   Bring crutches or walker if applicable.  Remove all piercings.  Leave jewelry and any other valuables at home.  Hair extensions with metal clips  must be removed prior to surgery.  The Pre-Admission Testing nurse will instruct you to bring medications if unable to obtain an accurate list in Pre-Admission Testing.          Preventing a Surgical Site Infection:  For 2 to 3 days before surgery, avoid shaving with a razor because the razor can irritate skin and make it easier to develop an infection.    Any areas of open skin can increase the risk of a post-operative wound infection by allowing bacteria to enter and travel throughout the body.  Notify your surgeon if you have any skin wounds / rashes even if it is not near the expected surgical site.  The area will need assessed to determine if surgery should be delayed until it is healed.  The night prior to surgery shower using a fresh bar of anti-bacterial soap (such as Dial) and clean washcloth.  Sleep in a clean bed with clean clothing.  Do not allow pets to sleep with you.  Shower on the morning of surgery using a fresh bar of anti-bacterial soap (such as Dial) and clean washcloth.  Dry with a clean towel and dress in clean clothing.  Ask your surgeon if you will be receiving antibiotics prior to surgery.  Make sure you, your family, and all healthcare providers clean their hands with soap and water or an alcohol based hand  before caring for you or your wound.    Day of surgery:  Your arrival time is approximately two hours before your scheduled surgery time.  Upon arrival, a Pre-op nurse and Anesthesiologist will review your health history, obtain vital signs, and answer questions you may have.  The only belongings needed at this time will be a list of your home medications and if applicable your C-PAP/BI-PAP machine.  A Pre-op nurse will start an IV and you may receive medication in preparation for surgery, including something to help you relax.     Please be aware that surgery does come with discomfort.  We want to make every effort to control your discomfort so please discuss any uncontrolled  symptoms with your nurse.   Your doctor will most likely have prescribed pain medications.      If you are going home after surgery you will receive individualized written care instructions before being discharged.  A responsible adult must drive you to and from the hospital on the day of your surgery and stay with you for 24 hours.  Discharge prescriptions can be filled by the hospital pharmacy during regular pharmacy hours.  If you are having surgery late in the day/evening your prescription may be e-prescribed to your pharmacy.  Please verify your pharmacy hours or chose a 24 hour pharmacy to avoid not having access to your prescription because your pharmacy has closed for the day.    If you are staying overnight following surgery, you will be transported to your hospital room following the recovery period.  [facility] has all private rooms.    If you have any questions please call Pre-Admission Testing at [phone].  Deductibles and co-payments are collected on the day of service. Please be prepared to pay the required co-pay, deductible or deposit on the day of service as defined by your plan.    Call your surgeon immediately if you experience any of the following symptoms:  Sore Throat  Shortness of Breath or difficulty breathing  Cough  Chills  Body soreness or muscle pain  Headache  Fever  New loss of taste or smell  Do not arrive for your surgery ill.  Your procedure will need to be rescheduled to another time.  You will need to call your physician before the day of surgery to avoid any unnecessary exposure to hospital staff as well as other patients.       CHLORHEXIDINE CLOTH INSTRUCTIONS  The morning of surgery follow these instructions using the Chlorhexidine cloths you've been given.  These steps reduce bacteria on the body.  Do not use the cloths near your eyes, ears mouth, genitalia or on open wounds.  Throw the cloths away after use but do not try to flush them down a toilet.       Open and remove one cloth at a time from the package.    Leave the cloth unfolded and begin the bathing.  Massage the skin with the cloths using gentle pressure to remove bacteria.  Do not scrub harshly.   Follow the steps below with one 2% CHG cloth per area (6 total cloths).  One cloth for neck, shoulders and chest.  One cloth for both arms, hands, fingers and underarms (do underarms last).  One cloth for the abdomen followed by groin.  One cloth for right leg and foot including between the toes.  One cloth for left leg and foot including between the toes.  The last cloth is to be used for the back of the neck, back and buttocks.    Allow the CHG to air dry 3 minutes on the skin which will give it time to work and decrease the chance of irritation.  The skin may feel sticky until it is dry.  Do not rinse with water or any other liquid or you will lose the beneficial effects of the CHG.  If mild skin irritation occurs, do rinse the skin to remove the CHG.  Report this to the nurse at time of admission.  Do not apply lotions, creams, ointments, deodorants or perfumes after using the clothes. Dress in clean clothes before coming to the hospital.

## 2022-09-07 DIAGNOSIS — I10 PRIMARY HYPERTENSION: ICD-10-CM

## 2022-09-07 RX ORDER — LISINOPRIL 30 MG/1
30 TABLET ORAL DAILY
Qty: 90 TABLET | Refills: 0 | Status: SHIPPED | OUTPATIENT
Start: 2022-09-07 | End: 2022-12-12

## 2022-09-08 ENCOUNTER — OFFICE VISIT (OUTPATIENT)
Dept: ORTHOPEDIC SURGERY | Facility: CLINIC | Age: 62
End: 2022-09-08

## 2022-09-08 VITALS
DIASTOLIC BLOOD PRESSURE: 90 MMHG | HEIGHT: 66 IN | SYSTOLIC BLOOD PRESSURE: 160 MMHG | WEIGHT: 255.7 LBS | TEMPERATURE: 96 F | BODY MASS INDEX: 41.09 KG/M2

## 2022-09-08 DIAGNOSIS — R52 PAIN: Primary | ICD-10-CM

## 2022-09-08 PROCEDURE — 73502 X-RAY EXAM HIP UNI 2-3 VIEWS: CPT | Performed by: NURSE PRACTITIONER

## 2022-09-08 PROCEDURE — S0260 H&P FOR SURGERY: HCPCS | Performed by: NURSE PRACTITIONER

## 2022-09-08 NOTE — H&P (VIEW-ONLY)
Patient: Breana Arreola    Date of Admission: 9/12/2022    YOB: 1960    Medical Record Number: [id number]    Admitting Physician: Dr. Michelet Peng    Reason for Admission: End Stage Left Hip OA    History of Present Illness: 62 y.o. female presents with severe end stage hip osteoarthritis which has not been responsive to the full compliment of conservative measures. Despite conservative attempts, there is still severe, constant activity limiting hip pain. Given the severity of the pain, the patient has elected to proceed with hip replacement.    Allergies: No Known Allergies      Current Medications:  Home Medications:    Current Outpatient Medications on File Prior to Visit   Medication Sig   • acetaminophen (TYLENOL) 650 MG 8 hr tablet Take 650 mg by mouth Every 8 (Eight) Hours As Needed for Mild Pain.   • Chlorhexidine Gluconate 2 % pads Apply  topically. As directed pre op   • Cholecalciferol (VITAMIN D3) 2000 UNITS tablet Take 1 tablet by mouth Daily. PT HOLDING FOR SURGERY   • Collagen Hydrolysate powder Take 1 dose by mouth Daily. PT HOLDING FOR SURGERY   • lisinopril (PRINIVIL,ZESTRIL) 30 MG tablet TAKE 1 TABLET BY MOUTH DAILY   • Magnesium 100 MG tablet Take 1 tablet by mouth Daily. PT HOLDING FOR SURGERY   • Multiple Vitamins-Minerals (ALIVE ONCE DAILY WOMENS 50+) tablet Take 1 tablet by mouth Daily. PT HOLDING FOR SURGERY   • Probiotic capsule Take 1 capsule by mouth Daily. PT HOLDING FOR SURGERY   • vitamin B-12 (CYANOCOBALAMIN) 100 MCG tablet Take 50 mcg by mouth Daily. PT HOLDING FOR SURGERY     No current facility-administered medications on file prior to visit.     PRN Meds:.    PMH:  Past Medical History:   Diagnosis Date   • Anxiety about health    • At risk for sleep apnea     STOP BANG SCORE 5   • Elevated fasting glucose    • Hip pain    • History of prediabetes    • History of recent fall 02/2022   • History of skin cancer    • Hypertension    • Osteoarthritis    • Psoriasis    •  Vitamin D deficiency         PSURGH:  Past Surgical History:   Procedure Laterality Date   • COLONOSCOPY     • FOOT SURGERY Left     At age 9 for pronated foot   • MOHS SURGERY      Basal cell carcinoma excision, nose       SocialHx:  Social History     Occupational History   • Not on file   Tobacco Use   • Smoking status: Never Smoker   • Smokeless tobacco: Never Used   Vaping Use   • Vaping Use: Never used   Substance and Sexual Activity   • Alcohol use: Yes     Comment: FEW DRINKS OVER WEEKEND   • Drug use: No   • Sexual activity: Yes     Partners: Male     Comment:       Social History     Social History Narrative   • Not on file       FamHx:  Family History   Problem Relation Age of Onset   • Lung cancer Mother    • Lung cancer Father    • Malig Hyperthermia Neg Hx          Review of Systems:   A 14 point review of systems was performed, pertinent positives discussed above, all other systems are negative    Physical Exam: 62 y.o. female  Vital Signs : There were no vitals filed for this visit.  General: Alert and Oriented x 3, No acute distress.  Psych: mood and affect appropriate; recent and remote memory intact  Eyes: conjunctivae clear; pupils equally round and reactive, sclerae antiicteric  CV: no peripheral edema  Resp: normal respiratory effort  Skin: no rashes or wounds; normal turgor  Musculosketetal; pain with hip range of motion. Positive Stinchfeld test. No trochanteric tenderness.  Vascular: palpable distal pulses    Labs:    Pre-Admission Testing on 09/01/2022   Component Date Value Ref Range Status   • Glucose 09/01/2022 109 (A) 65 - 99 mg/dL Final   • BUN 09/01/2022 12  8 - 23 mg/dL Final   • Creatinine 09/01/2022 0.66  0.57 - 1.00 mg/dL Final   • Sodium 09/01/2022 141  136 - 145 mmol/L Final   • Potassium 09/01/2022 5.0  3.5 - 5.2 mmol/L Final   • Chloride 09/01/2022 107  98 - 107 mmol/L Final   • CO2 09/01/2022 24.6  22.0 - 29.0 mmol/L Final   • Calcium 09/01/2022 9.0  8.6 - 10.5 mg/dL  Final   • BUN/Creatinine Ratio 09/01/2022 18.2  7.0 - 25.0 Final   • Anion Gap 09/01/2022 9.4  5.0 - 15.0 mmol/L Final   • eGFR 09/01/2022 100.6  >60.0 mL/min/1.73 Final    National Kidney Foundation and American Society of Nephrology (ASN) Task Force recommended calculation based on the Chronic Kidney Disease Epidemiology Collaboration (CKD-EPI) equation refit without adjustment for race.   • WBC 09/01/2022 6.04  3.40 - 10.80 10*3/mm3 Final   • RBC 09/01/2022 4.27  3.77 - 5.28 10*6/mm3 Final   • Hemoglobin 09/01/2022 13.3  12.0 - 15.9 g/dL Final   • Hematocrit 09/01/2022 39.2  34.0 - 46.6 % Final   • MCV 09/01/2022 91.8  79.0 - 97.0 fL Final   • MCH 09/01/2022 31.1  26.6 - 33.0 pg Final   • MCHC 09/01/2022 33.9  31.5 - 35.7 g/dL Final   • RDW 09/01/2022 12.5  12.3 - 15.4 % Final   • RDW-SD 09/01/2022 41.9  37.0 - 54.0 fl Final   • MPV 09/01/2022 10.1  6.0 - 12.0 fL Final   • Platelets 09/01/2022 318  140 - 450 10*3/mm3 Final   • QT Interval 09/01/2022 367  ms Final     Xrays:  Xrays AP pelvis and a lateral of the Left hip were reviewed demonstrating  End stage hip OA with bone on bone articulation, subchondral cysts and periarticular osteophytes.    Assessment:  End-stage Left hip osteoarthritis. Conservative measures have failed.      Plan:  The plan is to proceed with Left Total Hip Replacement. The patient voiced understanding of the risks, benefits, and alternative forms of treatment that were discussed with Dr Peng at the time of scheduling.  23-hour home health, Irrisept, Omnicef 300 mg twice daily x10 days, possible dual mobility    Lakeisha Smith, APRN  9/8/2022

## 2022-09-08 NOTE — H&P
Patient: Breana Arreola    Date of Admission: 9/12/2022    YOB: 1960    Medical Record Number: [id number]    Admitting Physician: Dr. Michelet Peng    Reason for Admission: End Stage Left Hip OA    History of Present Illness: 62 y.o. female presents with severe end stage hip osteoarthritis which has not been responsive to the full compliment of conservative measures. Despite conservative attempts, there is still severe, constant activity limiting hip pain. Given the severity of the pain, the patient has elected to proceed with hip replacement.    Allergies: No Known Allergies      Current Medications:  Home Medications:    Current Outpatient Medications on File Prior to Visit   Medication Sig   • acetaminophen (TYLENOL) 650 MG 8 hr tablet Take 650 mg by mouth Every 8 (Eight) Hours As Needed for Mild Pain.   • Chlorhexidine Gluconate 2 % pads Apply  topically. As directed pre op   • Cholecalciferol (VITAMIN D3) 2000 UNITS tablet Take 1 tablet by mouth Daily. PT HOLDING FOR SURGERY   • Collagen Hydrolysate powder Take 1 dose by mouth Daily. PT HOLDING FOR SURGERY   • lisinopril (PRINIVIL,ZESTRIL) 30 MG tablet TAKE 1 TABLET BY MOUTH DAILY   • Magnesium 100 MG tablet Take 1 tablet by mouth Daily. PT HOLDING FOR SURGERY   • Multiple Vitamins-Minerals (ALIVE ONCE DAILY WOMENS 50+) tablet Take 1 tablet by mouth Daily. PT HOLDING FOR SURGERY   • Probiotic capsule Take 1 capsule by mouth Daily. PT HOLDING FOR SURGERY   • vitamin B-12 (CYANOCOBALAMIN) 100 MCG tablet Take 50 mcg by mouth Daily. PT HOLDING FOR SURGERY     No current facility-administered medications on file prior to visit.     PRN Meds:.    PMH:  Past Medical History:   Diagnosis Date   • Anxiety about health    • At risk for sleep apnea     STOP BANG SCORE 5   • Elevated fasting glucose    • Hip pain    • History of prediabetes    • History of recent fall 02/2022   • History of skin cancer    • Hypertension    • Osteoarthritis    • Psoriasis    •  Vitamin D deficiency         PSURGH:  Past Surgical History:   Procedure Laterality Date   • COLONOSCOPY     • FOOT SURGERY Left     At age 9 for pronated foot   • MOHS SURGERY      Basal cell carcinoma excision, nose       SocialHx:  Social History     Occupational History   • Not on file   Tobacco Use   • Smoking status: Never Smoker   • Smokeless tobacco: Never Used   Vaping Use   • Vaping Use: Never used   Substance and Sexual Activity   • Alcohol use: Yes     Comment: FEW DRINKS OVER WEEKEND   • Drug use: No   • Sexual activity: Yes     Partners: Male     Comment:       Social History     Social History Narrative   • Not on file       FamHx:  Family History   Problem Relation Age of Onset   • Lung cancer Mother    • Lung cancer Father    • Malig Hyperthermia Neg Hx          Review of Systems:   A 14 point review of systems was performed, pertinent positives discussed above, all other systems are negative    Physical Exam: 62 y.o. female  Vital Signs : There were no vitals filed for this visit.  General: Alert and Oriented x 3, No acute distress.  Psych: mood and affect appropriate; recent and remote memory intact  Eyes: conjunctivae clear; pupils equally round and reactive, sclerae antiicteric  CV: no peripheral edema  Resp: normal respiratory effort  Skin: no rashes or wounds; normal turgor  Musculosketetal; pain with hip range of motion. Positive Stinchfeld test. No trochanteric tenderness.  Vascular: palpable distal pulses    Labs:    Pre-Admission Testing on 09/01/2022   Component Date Value Ref Range Status   • Glucose 09/01/2022 109 (A) 65 - 99 mg/dL Final   • BUN 09/01/2022 12  8 - 23 mg/dL Final   • Creatinine 09/01/2022 0.66  0.57 - 1.00 mg/dL Final   • Sodium 09/01/2022 141  136 - 145 mmol/L Final   • Potassium 09/01/2022 5.0  3.5 - 5.2 mmol/L Final   • Chloride 09/01/2022 107  98 - 107 mmol/L Final   • CO2 09/01/2022 24.6  22.0 - 29.0 mmol/L Final   • Calcium 09/01/2022 9.0  8.6 - 10.5 mg/dL  Final   • BUN/Creatinine Ratio 09/01/2022 18.2  7.0 - 25.0 Final   • Anion Gap 09/01/2022 9.4  5.0 - 15.0 mmol/L Final   • eGFR 09/01/2022 100.6  >60.0 mL/min/1.73 Final    National Kidney Foundation and American Society of Nephrology (ASN) Task Force recommended calculation based on the Chronic Kidney Disease Epidemiology Collaboration (CKD-EPI) equation refit without adjustment for race.   • WBC 09/01/2022 6.04  3.40 - 10.80 10*3/mm3 Final   • RBC 09/01/2022 4.27  3.77 - 5.28 10*6/mm3 Final   • Hemoglobin 09/01/2022 13.3  12.0 - 15.9 g/dL Final   • Hematocrit 09/01/2022 39.2  34.0 - 46.6 % Final   • MCV 09/01/2022 91.8  79.0 - 97.0 fL Final   • MCH 09/01/2022 31.1  26.6 - 33.0 pg Final   • MCHC 09/01/2022 33.9  31.5 - 35.7 g/dL Final   • RDW 09/01/2022 12.5  12.3 - 15.4 % Final   • RDW-SD 09/01/2022 41.9  37.0 - 54.0 fl Final   • MPV 09/01/2022 10.1  6.0 - 12.0 fL Final   • Platelets 09/01/2022 318  140 - 450 10*3/mm3 Final   • QT Interval 09/01/2022 367  ms Final     Xrays:  Xrays AP pelvis and a lateral of the Left hip were reviewed demonstrating  End stage hip OA with bone on bone articulation, subchondral cysts and periarticular osteophytes.    Assessment:  End-stage Left hip osteoarthritis. Conservative measures have failed.      Plan:  The plan is to proceed with Left Total Hip Replacement. The patient voiced understanding of the risks, benefits, and alternative forms of treatment that were discussed with Dr Peng at the time of scheduling.  23-hour home health, Irrisept, Omnicef 300 mg twice daily x10 days, possible dual mobility    Lakeisha Smith, APRN  9/8/2022

## 2022-09-09 ENCOUNTER — TELEPHONE (OUTPATIENT)
Dept: INTERNAL MEDICINE | Facility: CLINIC | Age: 62
End: 2022-09-09

## 2022-09-09 NOTE — TELEPHONE ENCOUNTER
D/w patient- let her know that I can send this to Dr. Kirkland but I doubt that this will be handled before Monday. She actually said that she would be worried about starting something so close to surgery anyway. Sending as a FYI

## 2022-09-09 NOTE — TELEPHONE ENCOUNTER
Caller: Breana Arreola    Relationship: Self    Best call back number: [phone]    What is the best time to reach you: ASAP    Who are you requesting to speak with (clinical staff, provider,  specific staff member): CLINICAL STAFF    What was the call regarding: PATIENT STATES SHE HAD A PRE OP APPOINTMENT ON 09.01.22 AND BLOOD PRESSURE /90, YESTERDAY HER BLOOD PRESSURE /90. PATIENT'S SURGEON'S ASSISTANCE REQUESTED THE PATIENT REACH OUT TO PCP TO SEE IF ANY CHANGED CAN BE MADE TO LOWER THE PATIENTS BLOOD PRESSURE PRIOR TO SURGERY FOR A TOTAL HIP REPLACEMENT. SURGERY IS SCHEDULED FOR 09.12.22    Do you require a callback: YES

## 2022-09-12 ENCOUNTER — HOSPITAL ENCOUNTER (OUTPATIENT)
Facility: HOSPITAL | Age: 62
Discharge: HOME HEALTH | End: 2022-09-13
Attending: ORTHOPAEDIC SURGERY | Admitting: ORTHOPAEDIC SURGERY

## 2022-09-12 ENCOUNTER — APPOINTMENT (OUTPATIENT)
Dept: GENERAL RADIOLOGY | Facility: HOSPITAL | Age: 62
End: 2022-09-12

## 2022-09-12 ENCOUNTER — ANESTHESIA (OUTPATIENT)
Dept: PERIOP | Facility: HOSPITAL | Age: 62
End: 2022-09-12

## 2022-09-12 ENCOUNTER — ANESTHESIA EVENT (OUTPATIENT)
Dept: PERIOP | Facility: HOSPITAL | Age: 62
End: 2022-09-12

## 2022-09-12 DIAGNOSIS — Z98.890 STATUS POST HIP SURGERY: Primary | ICD-10-CM

## 2022-09-12 DIAGNOSIS — M16.12 PRIMARY OSTEOARTHRITIS OF LEFT HIP: ICD-10-CM

## 2022-09-12 LAB
ABO GROUP BLD: NORMAL
BLD GP AB SCN SERPL QL: NEGATIVE
RH BLD: POSITIVE
T&S EXPIRATION DATE: NORMAL

## 2022-09-12 PROCEDURE — 25010000002 ONDANSETRON PER 1 MG: Performed by: ANESTHESIOLOGY

## 2022-09-12 PROCEDURE — 86901 BLOOD TYPING SEROLOGIC RH(D): CPT | Performed by: ORTHOPAEDIC SURGERY

## 2022-09-12 PROCEDURE — 86900 BLOOD TYPING SEROLOGIC ABO: CPT | Performed by: ORTHOPAEDIC SURGERY

## 2022-09-12 PROCEDURE — 25010000002 FENTANYL CITRATE (PF) 50 MCG/ML SOLUTION: Performed by: NURSE ANESTHETIST, CERTIFIED REGISTERED

## 2022-09-12 PROCEDURE — G0378 HOSPITAL OBSERVATION PER HR: HCPCS

## 2022-09-12 PROCEDURE — 25010000002 KETOROLAC TROMETHAMINE PER 15 MG: Performed by: ORTHOPAEDIC SURGERY

## 2022-09-12 PROCEDURE — 25010000002 NEOSTIGMINE 5 MG/10ML SOLUTION: Performed by: ANESTHESIOLOGY

## 2022-09-12 PROCEDURE — 25010000002 CEFAZOLIN IN DEXTROSE 2-4 GM/100ML-% SOLUTION: Performed by: ORTHOPAEDIC SURGERY

## 2022-09-12 PROCEDURE — 25010000002 VANCOMYCIN 10 G RECONSTITUTED SOLUTION: Performed by: ORTHOPAEDIC SURGERY

## 2022-09-12 PROCEDURE — 25010000002 PROPOFOL 10 MG/ML EMULSION: Performed by: NURSE ANESTHETIST, CERTIFIED REGISTERED

## 2022-09-12 PROCEDURE — 27130 TOTAL HIP ARTHROPLASTY: CPT | Performed by: NURSE PRACTITIONER

## 2022-09-12 PROCEDURE — 25010000002 PHENYLEPHRINE 10 MG/ML SOLUTION: Performed by: ANESTHESIOLOGY

## 2022-09-12 PROCEDURE — 27130 TOTAL HIP ARTHROPLASTY: CPT | Performed by: ORTHOPAEDIC SURGERY

## 2022-09-12 PROCEDURE — 25010000002 MORPHINE PER 10 MG: Performed by: ORTHOPAEDIC SURGERY

## 2022-09-12 PROCEDURE — C1776 JOINT DEVICE (IMPLANTABLE): HCPCS | Performed by: ORTHOPAEDIC SURGERY

## 2022-09-12 PROCEDURE — 73501 X-RAY EXAM HIP UNI 1 VIEW: CPT

## 2022-09-12 PROCEDURE — 25010000002 PROPOFOL 10 MG/ML EMULSION: Performed by: ANESTHESIOLOGY

## 2022-09-12 PROCEDURE — 86850 RBC ANTIBODY SCREEN: CPT | Performed by: ORTHOPAEDIC SURGERY

## 2022-09-12 PROCEDURE — 25010000002 ROPIVACAINE PER 1 MG: Performed by: ORTHOPAEDIC SURGERY

## 2022-09-12 PROCEDURE — 25010000002 EPINEPHRINE 1 MG/ML SOLUTION 30 ML VIAL: Performed by: ORTHOPAEDIC SURGERY

## 2022-09-12 PROCEDURE — 25010000002 DEXAMETHASONE PER 1 MG: Performed by: NURSE ANESTHETIST, CERTIFIED REGISTERED

## 2022-09-12 PROCEDURE — 25010000002 MAGNESIUM SULFATE PER 500 MG OF MAGNESIUM: Performed by: ANESTHESIOLOGY

## 2022-09-12 DEVICE — R3 20 DEGREE XLPE ACETABULAR LINER                                    36MM INNER DIAMETER X OUTER DIAMETER 52MM
Type: IMPLANTABLE DEVICE | Site: HIP | Status: FUNCTIONAL
Brand: R3

## 2022-09-12 DEVICE — REFLECTION SPHERICAL HEAD SCREW 30MM
Type: IMPLANTABLE DEVICE | Site: HIP | Status: FUNCTIONAL
Brand: REFLECTION

## 2022-09-12 DEVICE — POLARSTEM COLLAR STANDARD                                    NON-CEMENTED WITH TI/HA 2
Type: IMPLANTABLE DEVICE | Site: HIP | Status: FUNCTIONAL
Brand: POLARSTEM

## 2022-09-12 DEVICE — VIOLET ANTIBACTERIAL POLYDIOXANONE, KNOTLESS TISSUE CONTROL DEVICE
Type: IMPLANTABLE DEVICE | Site: HIP | Status: FUNCTIONAL
Brand: STRATAFIX

## 2022-09-12 DEVICE — KNOTLESS TISSUE CONTROL DEVICE, UNDYED UNIDIRECTIONAL (ANTIBACTERIAL) SYNTHETIC ABSORBABLE DEVICE
Type: IMPLANTABLE DEVICE | Site: HIP | Status: FUNCTIONAL
Brand: STRATAFIX

## 2022-09-12 DEVICE — OXINIUM FEMORAL HEAD 12/14 TAPER                                    36 MM L/+8
Type: IMPLANTABLE DEVICE | Site: HIP | Status: FUNCTIONAL
Brand: OXINIUM

## 2022-09-12 DEVICE — REFLECTION SPHERICAL HEAD SCREW 20MM
Type: IMPLANTABLE DEVICE | Site: HIP | Status: FUNCTIONAL
Brand: REFLECTION

## 2022-09-12 DEVICE — IMPLANTABLE DEVICE: Type: IMPLANTABLE DEVICE | Site: HIP | Status: FUNCTIONAL

## 2022-09-12 DEVICE — R3 3 HOLE ACETABULAR SHELL 52MM
Type: IMPLANTABLE DEVICE | Site: HIP | Status: FUNCTIONAL
Brand: R3 ACETABULAR

## 2022-09-12 RX ORDER — MELOXICAM 15 MG/1
15 TABLET ORAL DAILY
Qty: 14 TABLET | Refills: 0 | Status: SHIPPED | OUTPATIENT
Start: 2022-09-12 | End: 2022-09-27

## 2022-09-12 RX ORDER — ACETAMINOPHEN 10 MG/ML
INJECTION, SOLUTION INTRAVENOUS AS NEEDED
Status: DISCONTINUED | OUTPATIENT
Start: 2022-09-12 | End: 2022-09-12 | Stop reason: SURG

## 2022-09-12 RX ORDER — CEFAZOLIN SODIUM 2 G/100ML
2 INJECTION, SOLUTION INTRAVENOUS EVERY 8 HOURS
Status: COMPLETED | OUTPATIENT
Start: 2022-09-12 | End: 2022-09-13

## 2022-09-12 RX ORDER — SODIUM CHLORIDE 0.9 % (FLUSH) 0.9 %
3-10 SYRINGE (ML) INJECTION AS NEEDED
Status: DISCONTINUED | OUTPATIENT
Start: 2022-09-12 | End: 2022-09-12 | Stop reason: HOSPADM

## 2022-09-12 RX ORDER — ONDANSETRON 4 MG/1
4 TABLET, FILM COATED ORAL EVERY 8 HOURS PRN
Qty: 10 TABLET | Refills: 0 | Status: SHIPPED | OUTPATIENT
Start: 2022-09-12

## 2022-09-12 RX ORDER — CEFAZOLIN SODIUM 2 G/100ML
2 INJECTION, SOLUTION INTRAVENOUS ONCE
Status: COMPLETED | OUTPATIENT
Start: 2022-09-12 | End: 2022-09-12

## 2022-09-12 RX ORDER — PROMETHAZINE HYDROCHLORIDE 25 MG/1
25 SUPPOSITORY RECTAL ONCE AS NEEDED
Status: DISCONTINUED | OUTPATIENT
Start: 2022-09-12 | End: 2022-09-12 | Stop reason: HOSPADM

## 2022-09-12 RX ORDER — PANTOPRAZOLE SODIUM 40 MG/1
40 TABLET, DELAYED RELEASE ORAL DAILY
Qty: 14 TABLET | Refills: 0 | Status: SHIPPED | OUTPATIENT
Start: 2022-09-12 | End: 2022-09-27

## 2022-09-12 RX ORDER — GLYCOPYRROLATE 0.2 MG/ML
INJECTION INTRAMUSCULAR; INTRAVENOUS AS NEEDED
Status: DISCONTINUED | OUTPATIENT
Start: 2022-09-12 | End: 2022-09-12 | Stop reason: SURG

## 2022-09-12 RX ORDER — SODIUM CHLORIDE, SODIUM LACTATE, POTASSIUM CHLORIDE, CALCIUM CHLORIDE 600; 310; 30; 20 MG/100ML; MG/100ML; MG/100ML; MG/100ML
9 INJECTION, SOLUTION INTRAVENOUS CONTINUOUS
Status: DISCONTINUED | OUTPATIENT
Start: 2022-09-12 | End: 2022-09-12

## 2022-09-12 RX ORDER — PROMETHAZINE HYDROCHLORIDE 12.5 MG/1
12.5 TABLET ORAL EVERY 4 HOURS PRN
Status: DISCONTINUED | OUTPATIENT
Start: 2022-09-12 | End: 2022-09-13 | Stop reason: HOSPADM

## 2022-09-12 RX ORDER — IBUPROFEN 600 MG/1
600 TABLET ORAL ONCE AS NEEDED
Status: DISCONTINUED | OUTPATIENT
Start: 2022-09-12 | End: 2022-09-12 | Stop reason: HOSPADM

## 2022-09-12 RX ORDER — HYDROCODONE BITARTRATE AND ACETAMINOPHEN 7.5; 325 MG/1; MG/1
1 TABLET ORAL EVERY 4 HOURS PRN
Status: DISCONTINUED | OUTPATIENT
Start: 2022-09-12 | End: 2022-09-13 | Stop reason: HOSPADM

## 2022-09-12 RX ORDER — ASPIRIN 81 MG/1
TABLET ORAL
Qty: 60 TABLET | Refills: 0 | Status: SHIPPED | OUTPATIENT
Start: 2022-09-12

## 2022-09-12 RX ORDER — OXYCODONE AND ACETAMINOPHEN 7.5; 325 MG/1; MG/1
1 TABLET ORAL EVERY 4 HOURS PRN
Status: DISCONTINUED | OUTPATIENT
Start: 2022-09-12 | End: 2022-09-12 | Stop reason: HOSPADM

## 2022-09-12 RX ORDER — DIPHENHYDRAMINE HCL 25 MG
25 CAPSULE ORAL
Status: DISCONTINUED | OUTPATIENT
Start: 2022-09-12 | End: 2022-09-12 | Stop reason: HOSPADM

## 2022-09-12 RX ORDER — DIPHENHYDRAMINE HYDROCHLORIDE 50 MG/ML
12.5 INJECTION INTRAMUSCULAR; INTRAVENOUS
Status: DISCONTINUED | OUTPATIENT
Start: 2022-09-12 | End: 2022-09-12 | Stop reason: HOSPADM

## 2022-09-12 RX ORDER — UREA 10 %
1 LOTION (ML) TOPICAL NIGHTLY PRN
Status: DISCONTINUED | OUTPATIENT
Start: 2022-09-12 | End: 2022-09-13 | Stop reason: HOSPADM

## 2022-09-12 RX ORDER — TRANEXAMIC ACID 100 MG/ML
INJECTION, SOLUTION INTRAVENOUS AS NEEDED
Status: DISCONTINUED | OUTPATIENT
Start: 2022-09-12 | End: 2022-09-12 | Stop reason: SURG

## 2022-09-12 RX ORDER — LIDOCAINE HYDROCHLORIDE 20 MG/ML
INJECTION, SOLUTION INFILTRATION; PERINEURAL AS NEEDED
Status: DISCONTINUED | OUTPATIENT
Start: 2022-09-12 | End: 2022-09-12 | Stop reason: SURG

## 2022-09-12 RX ORDER — PROMETHAZINE HYDROCHLORIDE 25 MG/1
25 TABLET ORAL ONCE AS NEEDED
Status: DISCONTINUED | OUTPATIENT
Start: 2022-09-12 | End: 2022-09-12 | Stop reason: HOSPADM

## 2022-09-12 RX ORDER — LABETALOL HYDROCHLORIDE 5 MG/ML
5 INJECTION, SOLUTION INTRAVENOUS
Status: DISCONTINUED | OUTPATIENT
Start: 2022-09-12 | End: 2022-09-12 | Stop reason: HOSPADM

## 2022-09-12 RX ORDER — DEXAMETHASONE SODIUM PHOSPHATE 4 MG/ML
INJECTION, SOLUTION INTRA-ARTICULAR; INTRALESIONAL; INTRAMUSCULAR; INTRAVENOUS; SOFT TISSUE AS NEEDED
Status: DISCONTINUED | OUTPATIENT
Start: 2022-09-12 | End: 2022-09-12 | Stop reason: SURG

## 2022-09-12 RX ORDER — ASPIRIN 81 MG/1
81 TABLET ORAL EVERY 12 HOURS SCHEDULED
Status: DISCONTINUED | OUTPATIENT
Start: 2022-09-13 | End: 2022-09-13 | Stop reason: HOSPADM

## 2022-09-12 RX ORDER — NEOSTIGMINE METHYLSULFATE 0.5 MG/ML
INJECTION, SOLUTION INTRAVENOUS AS NEEDED
Status: DISCONTINUED | OUTPATIENT
Start: 2022-09-12 | End: 2022-09-12 | Stop reason: SURG

## 2022-09-12 RX ORDER — NALOXONE HCL 0.4 MG/ML
0.2 VIAL (ML) INJECTION AS NEEDED
Status: DISCONTINUED | OUTPATIENT
Start: 2022-09-12 | End: 2022-09-12 | Stop reason: HOSPADM

## 2022-09-12 RX ORDER — POLYETHYLENE GLYCOL 3350 17 G/17G
17 POWDER, FOR SOLUTION ORAL 2 TIMES DAILY
Qty: 238 G | Refills: 0 | Status: SHIPPED | OUTPATIENT
Start: 2022-09-12 | End: 2022-09-20

## 2022-09-12 RX ORDER — CEFDINIR 300 MG/1
300 CAPSULE ORAL 2 TIMES DAILY
Qty: 20 CAPSULE | Refills: 0 | Status: SHIPPED | OUTPATIENT
Start: 2022-09-12

## 2022-09-12 RX ORDER — LIDOCAINE HYDROCHLORIDE 10 MG/ML
0.5 INJECTION, SOLUTION EPIDURAL; INFILTRATION; INTRACAUDAL; PERINEURAL ONCE AS NEEDED
Status: DISCONTINUED | OUTPATIENT
Start: 2022-09-12 | End: 2022-09-12 | Stop reason: HOSPADM

## 2022-09-12 RX ORDER — HYDROCODONE BITARTRATE AND ACETAMINOPHEN 7.5; 325 MG/1; MG/1
1 TABLET ORAL ONCE AS NEEDED
Status: COMPLETED | OUTPATIENT
Start: 2022-09-12 | End: 2022-09-12

## 2022-09-12 RX ORDER — MELOXICAM 15 MG/1
15 TABLET ORAL ONCE
Status: COMPLETED | OUTPATIENT
Start: 2022-09-12 | End: 2022-09-12

## 2022-09-12 RX ORDER — FAMOTIDINE 10 MG/ML
20 INJECTION, SOLUTION INTRAVENOUS ONCE
Status: COMPLETED | OUTPATIENT
Start: 2022-09-12 | End: 2022-09-12

## 2022-09-12 RX ORDER — HYDRALAZINE HYDROCHLORIDE 20 MG/ML
5 INJECTION INTRAMUSCULAR; INTRAVENOUS
Status: DISCONTINUED | OUTPATIENT
Start: 2022-09-12 | End: 2022-09-12 | Stop reason: HOSPADM

## 2022-09-12 RX ORDER — PREGABALIN 75 MG/1
150 CAPSULE ORAL ONCE
Status: COMPLETED | OUTPATIENT
Start: 2022-09-12 | End: 2022-09-12

## 2022-09-12 RX ORDER — ROCURONIUM BROMIDE 10 MG/ML
INJECTION, SOLUTION INTRAVENOUS AS NEEDED
Status: DISCONTINUED | OUTPATIENT
Start: 2022-09-12 | End: 2022-09-12 | Stop reason: SURG

## 2022-09-12 RX ORDER — ONDANSETRON 2 MG/ML
4 INJECTION INTRAMUSCULAR; INTRAVENOUS ONCE AS NEEDED
Status: DISCONTINUED | OUTPATIENT
Start: 2022-09-12 | End: 2022-09-12 | Stop reason: HOSPADM

## 2022-09-12 RX ORDER — EPHEDRINE SULFATE 50 MG/ML
5 INJECTION, SOLUTION INTRAVENOUS ONCE AS NEEDED
Status: DISCONTINUED | OUTPATIENT
Start: 2022-09-12 | End: 2022-09-12 | Stop reason: HOSPADM

## 2022-09-12 RX ORDER — HYDROCODONE BITARTRATE AND ACETAMINOPHEN 7.5; 325 MG/1; MG/1
TABLET ORAL
Qty: 42 TABLET | Refills: 0 | Status: SHIPPED | OUTPATIENT
Start: 2022-09-12 | End: 2022-09-27 | Stop reason: SDUPTHER

## 2022-09-12 RX ORDER — CHLORHEXIDINE GLUCONATE 500 MG/1
CLOTH TOPICAL 2 TIMES DAILY
Status: DISCONTINUED | OUTPATIENT
Start: 2022-09-12 | End: 2022-09-13 | Stop reason: HOSPADM

## 2022-09-12 RX ORDER — SODIUM CHLORIDE 0.9 % (FLUSH) 0.9 %
3 SYRINGE (ML) INJECTION EVERY 12 HOURS SCHEDULED
Status: DISCONTINUED | OUTPATIENT
Start: 2022-09-12 | End: 2022-09-12 | Stop reason: HOSPADM

## 2022-09-12 RX ORDER — POVIDONE-IODINE 10 MG/ML
SOLUTION TOPICAL ONCE
Status: DISCONTINUED | OUTPATIENT
Start: 2022-09-12 | End: 2022-09-12 | Stop reason: HOSPADM

## 2022-09-12 RX ORDER — FENTANYL CITRATE 50 UG/ML
50 INJECTION, SOLUTION INTRAMUSCULAR; INTRAVENOUS
Status: DISCONTINUED | OUTPATIENT
Start: 2022-09-12 | End: 2022-09-12 | Stop reason: HOSPADM

## 2022-09-12 RX ORDER — MIDAZOLAM HYDROCHLORIDE 1 MG/ML
1 INJECTION INTRAMUSCULAR; INTRAVENOUS
Status: DISCONTINUED | OUTPATIENT
Start: 2022-09-12 | End: 2022-09-12 | Stop reason: HOSPADM

## 2022-09-12 RX ORDER — PROPOFOL 10 MG/ML
VIAL (ML) INTRAVENOUS AS NEEDED
Status: DISCONTINUED | OUTPATIENT
Start: 2022-09-12 | End: 2022-09-12 | Stop reason: SURG

## 2022-09-12 RX ORDER — ACETAMINOPHEN 325 MG/1
650 TABLET ORAL EVERY 6 HOURS PRN
Status: DISCONTINUED | OUTPATIENT
Start: 2022-09-12 | End: 2022-09-13 | Stop reason: HOSPADM

## 2022-09-12 RX ORDER — FENTANYL CITRATE 50 UG/ML
INJECTION, SOLUTION INTRAMUSCULAR; INTRAVENOUS AS NEEDED
Status: DISCONTINUED | OUTPATIENT
Start: 2022-09-12 | End: 2022-09-12 | Stop reason: SURG

## 2022-09-12 RX ORDER — MAGNESIUM HYDROXIDE 1200 MG/15ML
LIQUID ORAL AS NEEDED
Status: DISCONTINUED | OUTPATIENT
Start: 2022-09-12 | End: 2022-09-12 | Stop reason: HOSPADM

## 2022-09-12 RX ORDER — MAGNESIUM SULFATE HEPTAHYDRATE 500 MG/ML
INJECTION, SOLUTION INTRAMUSCULAR; INTRAVENOUS AS NEEDED
Status: DISCONTINUED | OUTPATIENT
Start: 2022-09-12 | End: 2022-09-12 | Stop reason: SURG

## 2022-09-12 RX ORDER — FLUMAZENIL 0.1 MG/ML
0.2 INJECTION INTRAVENOUS AS NEEDED
Status: DISCONTINUED | OUTPATIENT
Start: 2022-09-12 | End: 2022-09-12 | Stop reason: HOSPADM

## 2022-09-12 RX ORDER — ONDANSETRON 2 MG/ML
INJECTION INTRAMUSCULAR; INTRAVENOUS AS NEEDED
Status: DISCONTINUED | OUTPATIENT
Start: 2022-09-12 | End: 2022-09-12 | Stop reason: SURG

## 2022-09-12 RX ORDER — PHENYLEPHRINE HYDROCHLORIDE 10 MG/ML
INJECTION INTRAVENOUS AS NEEDED
Status: DISCONTINUED | OUTPATIENT
Start: 2022-09-12 | End: 2022-09-12 | Stop reason: SURG

## 2022-09-12 RX ORDER — HYDROMORPHONE HYDROCHLORIDE 1 MG/ML
0.5 INJECTION, SOLUTION INTRAMUSCULAR; INTRAVENOUS; SUBCUTANEOUS
Status: DISCONTINUED | OUTPATIENT
Start: 2022-09-12 | End: 2022-09-12 | Stop reason: HOSPADM

## 2022-09-12 RX ORDER — ONDANSETRON 4 MG/1
4 TABLET, FILM COATED ORAL EVERY 6 HOURS PRN
Status: DISCONTINUED | OUTPATIENT
Start: 2022-09-12 | End: 2022-09-13 | Stop reason: HOSPADM

## 2022-09-12 RX ADMIN — LIDOCAINE HYDROCHLORIDE 80 MG: 20 INJECTION, SOLUTION INFILTRATION; PERINEURAL at 14:13

## 2022-09-12 RX ADMIN — CEFAZOLIN SODIUM 2 G: 2 INJECTION, SOLUTION INTRAVENOUS at 14:02

## 2022-09-12 RX ADMIN — PROPOFOL 20 MG: 10 INJECTION, EMULSION INTRAVENOUS at 15:50

## 2022-09-12 RX ADMIN — FENTANYL CITRATE 50 MCG: 0.05 INJECTION, SOLUTION INTRAMUSCULAR; INTRAVENOUS at 14:13

## 2022-09-12 RX ADMIN — DEXAMETHASONE SODIUM PHOSPHATE 8 MG: 4 INJECTION, SOLUTION INTRAMUSCULAR; INTRAVENOUS at 14:22

## 2022-09-12 RX ADMIN — TRANEXAMIC ACID 1000 MG: 1 INJECTION, SOLUTION INTRAVENOUS at 15:41

## 2022-09-12 RX ADMIN — PROPOFOL 20 MG: 10 INJECTION, EMULSION INTRAVENOUS at 15:48

## 2022-09-12 RX ADMIN — FENTANYL CITRATE 50 MCG: 50 INJECTION INTRAMUSCULAR; INTRAVENOUS at 16:34

## 2022-09-12 RX ADMIN — NEOSTIGMINE METHYLSULFATE 3.5 MG: 0.5 INJECTION INTRAVENOUS at 15:48

## 2022-09-12 RX ADMIN — MELOXICAM 15 MG: 15 TABLET ORAL at 12:25

## 2022-09-12 RX ADMIN — MAGNESIUM SULFATE HEPTAHYDRATE 2 G: 500 INJECTION, SOLUTION INTRAMUSCULAR; INTRAVENOUS at 14:44

## 2022-09-12 RX ADMIN — PHENYLEPHRINE HYDROCHLORIDE 50 MCG: 10 INJECTION INTRAVENOUS at 15:06

## 2022-09-12 RX ADMIN — PROPOFOL 150 MCG/KG/MIN: 10 INJECTION, EMULSION INTRAVENOUS at 14:18

## 2022-09-12 RX ADMIN — ROCURONIUM BROMIDE 50 MG: 50 INJECTION INTRAVENOUS at 14:13

## 2022-09-12 RX ADMIN — PHENYLEPHRINE HYDROCHLORIDE 50 MCG: 10 INJECTION INTRAVENOUS at 15:04

## 2022-09-12 RX ADMIN — PHENYLEPHRINE HYDROCHLORIDE 100 MCG: 10 INJECTION INTRAVENOUS at 15:40

## 2022-09-12 RX ADMIN — SODIUM CHLORIDE, POTASSIUM CHLORIDE, SODIUM LACTATE AND CALCIUM CHLORIDE: 600; 310; 30; 20 INJECTION, SOLUTION INTRAVENOUS at 14:06

## 2022-09-12 RX ADMIN — ACETAMINOPHEN 1000 MG: 10 INJECTION, SOLUTION INTRAVENOUS at 14:22

## 2022-09-12 RX ADMIN — HYDROCODONE BITARTRATE AND ACETAMINOPHEN 1 TABLET: 7.5; 325 TABLET ORAL at 17:40

## 2022-09-12 RX ADMIN — PHENYLEPHRINE HYDROCHLORIDE 100 MCG: 10 INJECTION INTRAVENOUS at 15:43

## 2022-09-12 RX ADMIN — VANCOMYCIN HYDROCHLORIDE 1750 MG: 10 INJECTION, POWDER, LYOPHILIZED, FOR SOLUTION INTRAVENOUS at 12:40

## 2022-09-12 RX ADMIN — PREGABALIN 150 MG: 75 CAPSULE ORAL at 12:25

## 2022-09-12 RX ADMIN — PROPOFOL 200 MG: 10 INJECTION, EMULSION INTRAVENOUS at 14:13

## 2022-09-12 RX ADMIN — FAMOTIDINE 20 MG: 10 INJECTION, SOLUTION INTRAVENOUS at 12:24

## 2022-09-12 RX ADMIN — GLYCOPYRROLATE 0.7 MG: 0.2 INJECTION INTRAMUSCULAR; INTRAVENOUS at 15:48

## 2022-09-12 RX ADMIN — HYDROCODONE BITARTRATE AND ACETAMINOPHEN 1 TABLET: 7.5; 325 TABLET ORAL at 22:01

## 2022-09-12 RX ADMIN — ONDANSETRON 4 MG: 2 INJECTION INTRAMUSCULAR; INTRAVENOUS at 15:42

## 2022-09-12 RX ADMIN — SODIUM CHLORIDE, POTASSIUM CHLORIDE, SODIUM LACTATE AND CALCIUM CHLORIDE 500 ML: 600; 310; 30; 20 INJECTION, SOLUTION INTRAVENOUS at 12:12

## 2022-09-12 RX ADMIN — FENTANYL CITRATE 50 MCG: 0.05 INJECTION, SOLUTION INTRAMUSCULAR; INTRAVENOUS at 15:27

## 2022-09-12 RX ADMIN — TRANEXAMIC ACID 1000 MG: 1 INJECTION, SOLUTION INTRAVENOUS at 14:22

## 2022-09-12 RX ADMIN — PROPOFOL 20 MG: 10 INJECTION, EMULSION INTRAVENOUS at 15:51

## 2022-09-12 RX ADMIN — SODIUM CHLORIDE, POTASSIUM CHLORIDE, SODIUM LACTATE AND CALCIUM CHLORIDE: 600; 310; 30; 20 INJECTION, SOLUTION INTRAVENOUS at 15:44

## 2022-09-12 NOTE — ANESTHESIA PREPROCEDURE EVALUATION
Anesthesia Evaluation     NPO Solid Status: > 8 hours             Airway   Mallampati: II  Dental      Pulmonary    (+) sleep apnea,   (-) asthma, not a smoker    ROS comment: Positive AMANDEEP screenand 2 or more mitigating factors used (recovery in non-supine position and multimodal analgesia)    Cardiovascular     (+) hypertension, CRAIN,   (-) angina      Neuro/Psych  (+) psychiatric history Anxiety,    GI/Hepatic/Renal/Endo    (+) morbid obesity,      Musculoskeletal     Abdominal    Substance History      OB/GYN          Other   arthritis,                      Anesthesia Plan    ASA 3     general       Anesthetic plan, risks, benefits, and alternatives have been provided, discussed and informed consent has been obtained with: patient.        CODE STATUS:

## 2022-09-12 NOTE — ANESTHESIA PROCEDURE NOTES
Airway  Urgency: elective    Date/Time: 9/12/2022 2:15 PM  Airway not difficult    General Information and Staff    Patient location during procedure: OR  CRNA/CAA: Luz Maria Forbes CRNA    Indications and Patient Condition  Indications for airway management: airway protection    Preoxygenated: yes  Mask difficulty assessment: 2 - vent by mask + OA or adjuvant +/- NMBA    Final Airway Details  Final airway type: endotracheal airway      Successful airway: ETT  Cuffed: yes   Successful intubation technique: direct laryngoscopy  Endotracheal tube insertion site: oral  Blade: Gracia  Blade size: 2  ETT size (mm): 7.0  Cormack-Lehane Classification: grade I - full view of glottis  Placement verified by: chest auscultation and capnometry   Cuff volume (mL): 8  Number of attempts at approach: 1  Assessment: lips, teeth, and gum same as pre-op and atraumatic intubation    Additional Comments  PreO2 with 100% O2;  FeO2 >85%;  sniff position; easy mask ventilation;  Intubated with no difficultly after eyes taped; Appears atraumatic;  Lips and teeth intact as preop condition;  Airway secured. Connected to ventilator.

## 2022-09-12 NOTE — OP NOTE
Name: Breana Arreola  YOB: 1960    DATE OF SURGERY: 9/12/2022    PREOPERATIVE DIAGNOSIS: Left hip end-stage osteoarthritis    POSTOPERATIVE DIAGNOSIS: Left hip end-stage osteoarthritis    PROCEDURE PERFORMED: Left  total hip replacement    SURGEON: Michelet Peng M.D.    ASSISTANT: MEAGHAN BARNES    A surgical assistant was integral in ensuring a successful outcome with this procedure.  The assistant was utilized to assist in positioning the patient, draping the patient, was used throughout the case to provide with retraction of tissues, suctioning of blood and body fluids for visualization, positioning of the extremity to allow for proper exposure so that I could perform the procedure.  Without the use of a surgical assistant during this procedure I feel that the outcome may have been compromised or would have been suboptimal or at risk for complications.    IMPLANTS:  Implant Name Type Inv. Item Serial No.  Lot No. LRB No. Used Action   DEV CONTRL TISS STRATAFIX SYMM PDS PLUS LUCILA CT-1 60CM - [id number] Implant DEV CONTRL TISS STRATAFIX SYMM PDS PLUS LUCILA CT-1 60CM  [employer]  DIV [employer] DESTINY [employer] DESTINY [id number] Left 1 Implanted   DEV CONTRL TISS STRATAFIXSPIRALMNCRYL PLSPS2 REV3/0 45CM - [id number] Implant DEV CONTRL TISS STRATAFIXSPIRALMNCRYL PLSPS2 REV3/0 45CM  [employer]  DIV OF DESTINY AND DESTINY [id number] Left 1 Implanted   SCRW SPH HD REFLECTION 6.5X20MM - [id number] Implant SCRW SPH HD REFLECTION 6.5X20MM  EMERY AND NEPHEW [id number] Left 1 Implanted   LINER ACET R3 XLPE 20D 34U76GQ - [id number] Implant LINER ACET R3 XLPE 20D [id number]  EMERY AND NEPHEW [id number] Left 1 Implanted   SCRW SPH HD REFLECTION 6.5X30MM - [id number] Implant SCRW SPH HD REFLECTION 6.5X30MM  SMITH AND NEPHEW [id number] Left 1 Implanted   SHLL ACET R3 3H STD 52MM - [id number] Implant SHLL ACET R3 3H STD 52MM  EMERY AND NEPHEW [id number] Left 1 Implanted   STEM FEM/HIP POLARSTEM W/COLR STD SZ2 - [id number] Implant STEM FEM/HIP POLARSTEM W/COLR  STD SZ2  EMERY AND NEPHEW [id number] Left 1 Implanted   HD FEM/HIP OXINIUM TPR 12/14 36MM PLS8 - [id number] Implant HD FEM/HIP OXINIUM TPR 12/14 36MM PLS8  SMITH AND NEPHEW [id number] Left 1 Implanted       Estimated Blood Loss: 200 mL  Specimens : none  Complications: none    DESCRIPTION OF PROCEDURE:    The patient was taken to the operating room and placed in the supine position. A sequential compression device was carefully placed on the non-operative leg. Preoperative antibiotics were administered. Surgical time out was performed. After adequate induction of anesthesia the patient was then transferred onto the  table and positioned appropriately in the lateral decubitus position. The hip was then prepped and draped in the usual sterile fashion.    A posterior lateral surgical incision was then made.  The gluteus harris fascia was then divided.  The gluteus harris muscle was then bluntly dissected.  A Charnley self-retaining retractor was then placed.  A posterior capsulotomy was then performed.  The superior limb was divided in line with the piriformis tendon.  The hip was then dislocated.  There were end-stage arthritic findings.  The femoral neck osteotomy was performed according to the level dictated by the template.  The acetabulum was exposed with standard retractors.  The labrum and pulvinar were then excised.  The cup was then medialized with the starting acetabular reamer to the medial wall.  I then progressively reamed up to the appropriate size in 45°of abduction and 20° of anteversion. Line to line reaming was performed. At this point the bone was nicely prepared.  There was excellent bleeding bone. We then impacted the acetabular component in 45 of abduction and 20 of anteversion the cup was stable.  Per routine we augmented the fixation with 2 screws in the posterior and superior quadrant  The final liner was then placed and it locked in nicely.  At this point we injected the hip with anesthetic  cocktail solution.  We then turned our attention to the femur.   Standard retractors were placed to expose the femur.  The box osteotome was used to create a starting point.  The canal finder was then used to sound the canal.  The lateralizing reamer was then used to lateralize into the greater trochanter.  We progressively reamed and broached until the broach was very solid to axial and rotational stresses.  At this point we placed the trial neck and head.  Hip was very stable to flexion and internal rotation as well as extension and external rotation.  The leg lengths were measured to be equal.  We then removed the trial components, copiously irrigated the hip, and then impacted the final stem.  At this point we then trialed and chose the final head. The head was placed on a clean dry taper.  The leg lengths were again measured to be equal.  At this point the hip was copiously irrigated with pulse lavage and the capsule was then reapproximated with #1 PDS suture, and the remainder of the hip was closed in multiple layers in standard fashion..  There was excellent hemostasis. We placed a one-eighth inch Hemovac drain.  Sterile dressings were applied. At the end of the case, the sponge and needle counts were reported as being correct. There were no known complications. The patient was then transported to the recovery room.      Michelet Peng M.D.  9/12/2022

## 2022-09-13 ENCOUNTER — READMISSION MANAGEMENT (OUTPATIENT)
Dept: CALL CENTER | Facility: HOSPITAL | Age: 62
End: 2022-09-13

## 2022-09-13 VITALS
RESPIRATION RATE: 16 BRPM | WEIGHT: 255.73 LBS | HEART RATE: 70 BPM | TEMPERATURE: 97.2 F | OXYGEN SATURATION: 99 % | HEIGHT: 66 IN | BODY MASS INDEX: 41.1 KG/M2 | SYSTOLIC BLOOD PRESSURE: 116 MMHG | DIASTOLIC BLOOD PRESSURE: 71 MMHG

## 2022-09-13 LAB
HCT VFR BLD AUTO: 37.5 % (ref 34–46.6)
HGB BLD-MCNC: 12.4 G/DL (ref 12–15.9)

## 2022-09-13 PROCEDURE — 85014 HEMATOCRIT: CPT | Performed by: NURSE PRACTITIONER

## 2022-09-13 PROCEDURE — 97161 PT EVAL LOW COMPLEX 20 MIN: CPT

## 2022-09-13 PROCEDURE — 97530 THERAPEUTIC ACTIVITIES: CPT

## 2022-09-13 PROCEDURE — G0378 HOSPITAL OBSERVATION PER HR: HCPCS

## 2022-09-13 PROCEDURE — 97116 GAIT TRAINING THERAPY: CPT

## 2022-09-13 PROCEDURE — 25010000002 CEFAZOLIN IN DEXTROSE 2-4 GM/100ML-% SOLUTION: Performed by: NURSE PRACTITIONER

## 2022-09-13 PROCEDURE — 99024 POSTOP FOLLOW-UP VISIT: CPT | Performed by: NURSE PRACTITIONER

## 2022-09-13 PROCEDURE — 85018 HEMOGLOBIN: CPT | Performed by: NURSE PRACTITIONER

## 2022-09-13 RX ADMIN — HYDROCODONE BITARTRATE AND ACETAMINOPHEN 1 TABLET: 7.5; 325 TABLET ORAL at 04:59

## 2022-09-13 RX ADMIN — CEFAZOLIN SODIUM 2 G: 2 INJECTION, SOLUTION INTRAVENOUS at 00:28

## 2022-09-13 RX ADMIN — HYDROCODONE BITARTRATE AND ACETAMINOPHEN 1 TABLET: 7.5; 325 TABLET ORAL at 09:45

## 2022-09-13 RX ADMIN — CEFAZOLIN SODIUM 2 G: 2 INJECTION, SOLUTION INTRAVENOUS at 09:49

## 2022-09-13 RX ADMIN — LISINOPRIL 30 MG: 20 TABLET ORAL at 09:48

## 2022-09-13 RX ADMIN — HYDROCODONE BITARTRATE AND ACETAMINOPHEN 1 TABLET: 7.5; 325 TABLET ORAL at 14:08

## 2022-09-13 RX ADMIN — ASPIRIN 81 MG: 81 TABLET, COATED ORAL at 09:44

## 2022-09-13 NOTE — PLAN OF CARE
Goal Outcome Evaluation:   VSS- Pt alert and oriented. POD 1 of a  Left total hip posterior approach. Dressing C/D/I. Neurovascular checks wdl. Pt eating, drinking, voiding and ambulating well. Pt to discharge home today.

## 2022-09-13 NOTE — DISCHARGE PLACEMENT REQUEST
"Sulma Arreola (62 y.o. Female)             Date of Birth   1960    Social Security Number       Address   [address] [place] [address]    Home Phone   [phone]    MRN   [id number]       Yarsani   Hindu    Marital Status                               Admission Date   9/12/22    Admission Type   Elective    Admitting Provider   Michelet Peng MD    Attending Provider   Michelet Peng MD    Department, Room/Bed   [address], P785/1       Discharge Date       Discharge Disposition   [place] [facility]    [place]charge Destination                               Attending Provider: Michelet Peng MD    Allergies: No Known Allergies    Isolation: None   Infection: None   Code Status: Not on file   Advance Care Planning Activity    Ht: 167.6 cm (65.98\")   Wt: 116 kg (255 lb 11.7 oz)    Admission Cmt: None   Principal Problem: Primary osteoarthritis of left hip [M16.12]                 Active Insurance as of 9/12/2022     Primary Coverage     Payor Plan Insurance Group Employer/Plan Group    [facility] [id number]     Payor Plan Address Payor Plan Phone Number Payor Plan Fax Number Effective Dates    [address]   7/1/2016 - None Entered    [facility] [id number]       Subscriber Name Subscriber Birth Date Member ID       SULMA ARREOLA 1960 [id number]                 Emergency Contacts      (Rel.) Home Phone Work Phone Mobile Phone    Gregory Arreola (Spouse) [phone] -- --        "

## 2022-09-13 NOTE — PLAN OF CARE
Goal Outcome Evaluation:  Plan of Care Reviewed With: patient           Outcome Evaluation: Pt is a 63 y/o F POD 1 s/p L post/lat CELSO. Pt reports being I with mobility using a quad cane. Pt lives with spouse and has ramp installed. Pt completed CELSO protocol x 10 prior to functional mobility. Pt stood and ambulated 125' c RW requiring CGA. Pt presents with slowed pace and antalgic step-to pattern. PT provided verbal cues to avoid excessive ER. Pt required min A with L LE to return to supine. PT provided education regarding precautions and progression of PT. PT recommends home with assist and HHPT. Pt will benefit from skilled PT to address strength, ROM, and functional mobility.

## 2022-09-13 NOTE — DISCHARGE SUMMARY
Patient Name: Breana Arreola  Patient YOB: 1960    Date of Admission:  9/12/2022  Date of Discharge:  9/13/2022  Discharge Diagnosis: MA TOTAL HIP ARTHROPLASTY [90798] (TOTAL HIP ARTHROPLASTY)  Presenting Problem/History of Present Illness: Primary osteoarthritis of left hip [M16.12]  Admitting Physician: Dr Michelet Peng  Consults:   Consults     No orders found for last 30 day(s).          DETAILS OF HOSPITAL STAY:  Patient is a 62 y.o. female was admitted to the floor following the above procedure and underwent an uncomplicated hospital stay.  Patient did well with physical therapy and was ambulating well without problems.  On the day of discharge the wound was clean, dry and intact and calf was soft and nontender and Homans sign was negative.  Patient was tolerating   Diet Instructions     Advance Diet as Tolerated      May advance diet as tolerated while in hospital.    Diet:      Diet Texture / Consistency: Regular       without problems.  Patient will be discharged home.    Condition on Discharge:  Stable    Vital Signs  Temp:  [97.2 °F (36.2 °C)-98.5 °F (36.9 °C)] 97.2 °F (36.2 °C)  Heart Rate:  [69-99] 69  Resp:  [15-21] 16  BP: (117-168)/() 139/79    LABS:   Admission on 09/12/2022   Component Date Value Ref Range Status   • ABO Type 09/12/2022 B   Final   • RH type 09/12/2022 Positive   Final   • Antibody Screen 09/12/2022 Negative   Final   • T&S Expiration Date 09/12/2022 9/15/2022 11:59:59 PM   Final   • Hemoglobin 09/13/2022 12.4  12.0 - 15.9 g/dL Final   • Hematocrit 09/13/2022 37.5  34.0 - 46.6 % Final       No results found.        Discharge Medications     Discharge Medications      New Medications      Instructions Start Date   aspirin 81 MG EC tablet   Take 1 tablet by mouth twice daily for 14 days, then take 1 tablet by mouth daily for 30 days      cefdinir 300 MG capsule  Commonly known as: OMNICEF   300 mg, Oral, 2 Times Daily      HYDROcodone-acetaminophen 7.5-325 MG per  tablet  Commonly known as: NORCO   Take 1-2 tablets by mouth every 4-6 hours as needed for pain. Take 2 ONLY for severe pain.      meloxicam 15 MG tablet  Commonly known as: MOBIC   15 mg, Oral, Daily      ondansetron 4 MG tablet  Commonly known as: Zofran   4 mg, Oral, Every 8 Hours PRN      pantoprazole 40 MG EC tablet  Commonly known as: PROTONIX   40 mg, Oral, Daily      polyethylene glycol 17 GM/SCOOP powder  Commonly known as: MIRALAX   Mix 17 g (1 capful) in liquid and take by mouth 2 (Two) Times a Day for 7 days.         Continue These Medications      Instructions Start Date   lisinopril 30 MG tablet  Commonly known as: PRINIVIL,ZESTRIL   30 mg, Oral, Daily         Stop These Medications    acetaminophen 650 MG 8 hr tablet  Commonly known as: TYLENOL     Alive Once Daily Womens 50+ tablet tablet  Generic drug: multivitamin with minerals     Chlorhexidine Gluconate 2 % pads     Collagen Hydrolysate powder     Magnesium 100 MG tablet     Probiotic capsule     vitamin B-12 100 MCG tablet  Commonly known as: CYANOCOBALAMIN     Vitamin D3 50 MCG (2000 UT) tablet            Activity at Discharge:   Activity Instructions     Discharge Activity      Dr Michelet Peng  [address] [facility] [address], [address]  [place] [address]  [phone]      Discharge Information (HIP REPLACEMENT)    The first 2-3 days after surgery your pain will likely be diminished due to medications that were given to you during surgery. It is very important to follow a few simple instructions to continue with good pain control after arriving home.    Activity control :  DON'T OVERDO IT, small amounts of activity on a frequent basis. You are encouraged to get up and move around  for  short periods but do not engage in any prolonged walking, standing or activity until cleared by your physical therapist. You may walk short distances frequently.  You will be notified while in hospital if you have any specific hip precautions  Ice - you should ice the hip  as much as possible over the first week or two.  Placing a clean hand towel or pillowcase between the icepack and skin will allow you to ice for extended periods.   Pain Medication - Take some pain medication (1-2 tablets) on a regular schedule (every 4-6 hours) for the first 72 hours after arriving home. This is important to keep the pain under control as the operative medication begins to wear off. Make sure to have food in your stomach when taking the medication. If you develop any nausea with the pain medication, try taking Zofran 30 minutes before taking the pain pills. After the first 72 hours you can take the medication as needed based on your pain.  REMEMBER - PAIN MEDICATION WORKS BETTER AT PREVENTING PAIN THAN IT DOES IN CATCHING UP TO PAIN ONCE IT INCREASES.  Physical therapy:  Follow the instructions of your physical therapist.  You may put full weight on your leg unless instructed otherwise.  If you have hip precautions, this will be discussed with you in the hospital. Continue to follow any hip precautions / restrictions until your follow-up appointment.   If you have been told that you have no hip precautions then you may sit / lay/ bend in any position within reason.  If you lay on your side then you should place a pillow between your legs for the first 2 weeks.  For all patients - “listen to your hip” - meaning don't force your hip into an uncomfortable position.    Showering :   The waterproof dressing will allow you to shower as soon as you get home.    The dressing should be left in place.  After 7 days the suction unit will stop functioning and at that time you may disconnect the suction tube.    If the dressing becomes disloged or saturated it should be changed.  If you have a home health nurse or therapist they can be contacted to assist with dressing change or repair.  Please refer to the SHAKIRA information sheet if you have any questions about the dressing.  You may also call the SHAKIRA dressing  hotline for questions related to the dressing ([phone]). If there still other problems or questions related to the dressing despite these measures then you can contact Serena at our office [phone]    Driving : No driving during the first 2 weeks post surgery. After the 2 week visit, Dr. Peng, Lakeisha, or Gopal will determine when you’re ready to drive.    Blood Clot (DVT) Prevention:  Keep legs elevated when possible to limit swelling  Perform “calf pump” exercises regularly to encourage blood flow through the calf veins.  Most patients will be discharged on asprin 81mg (full strength) twice daily for 2 weeks, then once daily for four weeks. Some high risk patients may require Coumadin, Xarelto, or other blood thinners.    Follow-Up Visit: After arriving home, please call Dr Peng’s office ([phone]) to arrange your follow-up appointment. This visit will be approximately 2 weeks post-op.     Your Implant: Your hip implant is made of the finest materials available. It is made by [employer] [employer]. For more info visit [email or web address]. There are four components:  Polarstem titanium femoral stem  R3 titanium acetabular cup  Oxidized zirconium femoral head (Oxinium ™)  Crosslinked polyethylene acetabular insert    Patient May Shower; No Tub Baths, Pools or Hot Tubs      Weight Bearing As Tolerated            Discharge Instructions:   1)  Patient is to continue with physical therapy exercises daily and continue working with the physical therapist as ordered.  2)  Follow Posterior hip precautions.   3)  Patient may weight bear as tolerated.   4)  Apply ice regularly. You may ice for long periods of time as long as ice is not directly on the skin. Patient instructed on frequent calf pumping exercises.  Patient also instructed on incentive spirometer during hospitalization and encouraged to continue to use at home regularly.   5)  The dressing should be left in place. If waterproof dressing is  intact the patient may shower immediately following discharge. If the dressing becomes disloged or saturated it should be changed. Please refer to the SHAKIRA information sheet if you have any questions about the dressing.  If you have a home health nurse or therapist they can be contacted to assist with dressing change or repair. You may also call the [facility] dressing hotline for questions related to the dressing ([phone]). If there are still other problems or questions related to the dressing despite these measures then you can contact Serena at our office [phone].   6)  Follow up appointment in 2 weeks - patient to call the office at [phone] to schedule. 7)  Patient will be discharged on aspirin 81mg BID x weeks, then daily x 4weeks    Complete Discharge Diagnosis:    Patient Active Problem List   Diagnosis   • Hypertension   • Elevated fasting glucose   • Vitamin D deficiency   • Adiposity   • Difficulty hearing   • Abnormal blood chemistry   • History of basal cell carcinoma (BCC) of skin   • Primary osteoarthritis of left hip           Follow-up Appointments  Future Appointments   Date Time Provider Department Center   9/27/2022 11:20 AM Michelet Peng MD [facility] [address] [facility], [place]  09/13/22  08:03 EDT

## 2022-09-13 NOTE — PLAN OF CARE
Goal Outcome Evaluation:           Progress: improving   Pt is a post op day 1 of a left total hip, posterior approach. Pt continues with the SHAKIRA dressing, green light flashing. Pt has ambulated to the bathroom with an assist x1, voiding function intact. Pt continues with PO pain meds that provide relief. Pt educated on the importance of monitoring blood pressures related to comorbidity of HTN. Pt voiced understanding. Pt is resting at this time, will continue to monitor.

## 2022-09-13 NOTE — PROGRESS NOTES
Continued Stay Note  [facility]     Patient Name: Breana Arreola  MRN: [id number]  Today's Date: 9/13/2022    Admit Date: 9/12/2022     Discharge Plan     Row Name 09/13/22 1038       Plan    Plan [facility]    Patient/Family in Agreement with Plan yes    Plan Comments Spoke with pt, verified correct information on facesheet and explained the role of CCP. Pt would like to d/c home with [facility], referral given to Faith with [facility] who states they are able to accept. Plan will be to d/c home with [facility] and family support.    Final Discharge Disposition Code 01 - home or self-care    Final Note Pt to d/c home with [facility]               Discharge Codes    No documentation.               Expected Discharge Date and Time     Expected Discharge Date Expected Discharge Time    Sep 13, 2022             Cindi Mixon RN

## 2022-09-13 NOTE — THERAPY EVALUATION
Patient Name: Breana Arreola  : 1960    MRN: [id number]                              Today's Date: 2022       Admit Date: 2022    Visit Dx:     ICD-10-CM ICD-9-CM   1. Status post hip surgery  Z98.890 V45.89   2. Primary osteoarthritis of left hip  M16.12 715.15     Patient Active Problem List   Diagnosis   • Hypertension   • Elevated fasting glucose   • Vitamin D deficiency   • Adiposity   • Difficulty hearing   • Abnormal blood chemistry   • History of basal cell carcinoma (BCC) of skin   • Primary osteoarthritis of left hip     Past Medical History:   Diagnosis Date   • Anxiety about health    • At risk for sleep apnea     STOP BANG SCORE 5   • Elevated fasting glucose    • Hip pain    • History of prediabetes    • History of recent fall 2022   • History of skin cancer    • Hypertension    • Osteoarthritis    • Psoriasis    • Vitamin D deficiency      Past Surgical History:   Procedure Laterality Date   • COLONOSCOPY     • FOOT SURGERY Left     At age 9 for pronated foot   • MOHS SURGERY      Basal cell carcinoma excision, nose   • TOTAL HIP ARTHROPLASTY Left 2022    Procedure: TOTAL HIP ARTHROPLASTY;  Surgeon: Michelet Peng MD;  [place] [facility] [place] [facility] [place]  Service: [place]opedics;  Laterality: Left;      General Information     Row Name 22 1057          Physical Therapy Time and Intention    Document Type evaluation  -CS     Mode of Treatment individual therapy;physical therapy  -CS     Row Name 22 105          General Information    Patient Profile Reviewed yes  -CS     Prior Level of Function independent:;all household mobility;gait;transfer;bed mobility  pt reports using quad cane for mobility  -CS     Existing Precautions/Restrictions fall;left;hip, posterior  -CS     Barriers to Rehab none identified  -CS     Row Name 22 105          Living Environment    People in Home spouse  -CS     Row Name 22 1057          Home Main Entrance    Number of Stairs, Main  Entrance none;other (see comments)  temporary ramp installed  -CS     Row Name 09/13/22 1057          Stairs Within Home, Primary    Number of Stairs, Within Home, Primary none  -CS     Row Name 09/13/22 1057          Cognition    Orientation Status (Cognition) oriented x 4  -CS     Row Name 09/13/22 1057          Safety Issues, Functional Mobility    Impairments Affecting Function (Mobility) balance;endurance/activity tolerance;pain;range of motion (ROM);strength  -CS           User Key  (r) = Recorded By, (t) = Taken By, (c) = Cosigned By    Initials Name Provider Type    CS Vikki Espino, PT Physical Therapist               Mobility     Row Name 09/13/22 1058          Bed Mobility    Bed Mobility supine-sit;sit-supine  -CS     Supine-Sit [place] (Bed Mobility) minimum assist (75% patient effort);verbal cues;nonverbal cues (demo/gesture)  -CS     Sit-Supine [place] (Bed Mobility) minimum assist (75% patient effort);verbal cues;nonverbal cues (demo/gesture)  -     Assistive Device (Bed Mobility) bed rails;head of bed elevated  -CS     Comment, (Bed Mobility) pt required assist with L LE  -CS     Row Name 09/13/22 1058          Sit-Stand Transfer    Sit-Stand [place] (Transfers) standby assist;verbal cues  -     Assistive Device (Sit-Stand Transfers) walker, front-wheeled  -     Row Name 09/13/22 1058          Gait/Stairs (Locomotion)    [place] Level (Gait) contact guard;verbal cues;nonverbal cues (demo/gesture)  -     Assistive Device (Gait) walker, front-wheeled  -     Distance in Feet (Gait) 125'  -     Deviations/Abnormal Patterns (Gait) antalgic;alize decreased;gait speed decreased;stride length decreased;weight shifting decreased  -CS     Comment, (Gait/Stairs) verbal cues provided to avoid excessive toe-out pattern  -CS     Row Name 09/13/22 1058          Mobility    Extremity Weight-bearing Status left lower extremity  -CS     Left Lower Extremity (Weight-bearing Status)  weight-bearing as tolerated (WBAT)  -           User Key  (r) = Recorded By, (t) = Taken By, (c) = Cosigned By    Initials Name Provider Type    CS Vikki Espino, PT Physical Therapist               Obj/Interventions     Row Name 09/13/22 1059          Range of Motion Comprehensive    General Range of Motion bilateral lower extremity ROM WFL  -CS     Comment, General Range of Motion L LE limited secondary to post-op  -CS     [facility] Name 09/13/22 1059          Strength Comprehensive (MMT)    General Manual Muscle Testing (MMT) Assessment no strength deficits identified  -     Comment, General Manual Muscle Testing (MMT) Assessment L LE limited secondary to post-op  -CS     Row Name 09/13/22 1059          Motor Skills    Therapeutic Exercise other (see comments)  10 reps L CELSO protocol  -CS     Row Name 09/13/22 1059          Balance    Balance Assessment sitting static balance;sitting dynamic balance;standing static balance;standing dynamic balance  -CS     Static Sitting Balance modified independence  -CS     Dynamic Sitting Balance modified independence  -CS     Position, Sitting Balance unsupported;sitting edge of bed  -CS     Static Standing Balance standby assist  -CS     Dynamic Standing Balance contact guard  -CS     Position/Device Used, Standing Balance walker, front-wheeled  -CS           User Key  (r) = Recorded By, (t) = Taken By, (c) = Cosigned By    Initials Name Provider Type    CS Vikki Espino, PT Physical Therapist               Goals/Plan     [facility] Name 09/13/22 1103          Bed Mobility Goal 1 (PT)    Activity/Assistive Device (Bed Mobility Goal 1, PT) sit to supine;supine to sit  -CS     [place] Level/Cues Needed (Bed Mobility Goal 1, PT) contact guard required  -CS     Time Frame (Bed Mobility Goal 1, PT) 1 week  -[facility] Name 09/13/22 1103          Transfer Goal 1 (PT)    Activity/Assistive Device (Transfer Goal 1, PT) sit-to-stand/stand-to-sit  -CS     [place] Level/Cues  Needed (Transfer Goal 1, PT) modified independence  -CS     Time Frame (Transfer Goal 1, PT) 1 week  -CS     Row Name 09/13/22 1103          Gait Training Goal 1 (PT)    Activity/Assistive Device (Gait Training Goal 1, PT) gait (walking locomotion);assistive device use;decrease asymmetrical patterns;decrease fall risk;improve balance and speed;increase endurance/gait distance  -CS     Day Level (Gait Training Goal 1, PT) standby assist  -CS     Distance (Gait Training Goal 1, PT) 200'  -CS     Time Frame (Gait Training Goal 1, PT) 1 week  -CS           User Key  (r) = Recorded By, (t) = Taken By, (c) = Cosigned By    Initials Name Provider Type    CS Vikki Espino, PT Physical Therapist               Clinical Impression     Row Name 09/13/22 1052          Pain    Pretreatment Pain Rating 0/10 - no pain  -CS     Posttreatment Pain Rating 0/10 - no pain  -CS     Row Name 09/13/22 1051          Plan of Care Review    Plan of Care Reviewed With patient  -CS     Outcome Evaluation Pt is a 63 y/o F POD 1 s/p L post/lat CELSO. Pt reports being I with mobility using a quad cane. Pt lives with spouse and has ramp installed. Pt completed CELSO protocol x 10 prior to functional mobility. Pt stood and ambulated 125' c RW requiring CGA. Pt presents with slowed pace and antalgic step-to pattern. PT provided verbal cues to avoid excessive ER. Pt required min A with L LE to return to supine. PT provided education regarding precautions and progression of PT. PT recommends home with assist and HHPT. Pt will benefit from skilled PT to address strength, ROM, and functional mobility.  -CS     Row Name 09/13/22 1942          Therapy Assessment/Plan (PT)    Patient/Family Therapy Goals Statement (PT) to return home  -CS     Rehab Potential (PT) good, to achieve stated therapy goals  -CS     Criteria for Skilled Interventions Met (PT) yes;meets criteria  -CS     Therapy Frequency (PT) daily  -     Row Name 09/13/22 7882           Positioning and Restraints    Pre-Treatment Position in bed  -CS     Post Treatment Position bed  -CS     In Bed supine;call light within reach;encouraged to call for assist;exit alarm on  -CS           User Key  (r) = Recorded By, (t) = Taken By, (c) = Cosigned By    Initials Name Provider Type    Vikki Mooney, NITISH Physical Therapist               Outcome Measures     Row Name 09/13/22 1103 09/13/22 0304       How much help from another person do you currently need...    Turning from your back to your side while in flat bed without using bedrails? 3  -CS 3  -JF    Moving from lying on back to sitting on the side of a flat bed without bedrails? 3  -CS 3  -JF    Moving to and from a bed to a chair (including a wheelchair)? 3  -CS 3  -JF    Standing up from a chair using your arms (e.g., wheelchair, bedside chair)? 4  -CS 3  -JF    Climbing 3-5 steps with a railing? 3  -CS 3  -JF    To walk in hospital room? 3  -CS 3  -JF    AM-PAC 6 Clicks Score (PT) 19  -CS 18  -JF    Highest level of mobility 6 --> Walked 10 steps or more  -CS 6 --> Walked 10 steps or more  -JF    Row Name 09/13/22 1103          Functional Assessment    Outcome Measure Options AM-PAC 6 Clicks Basic Mobility (PT)  -CS           User Key  (r) = Recorded By, (t) = Taken By, (c) = Cosigned By    Initials Name Provider Type    Betty Irby, RN Registered Nurse    Vikki Mooney, PT Physical Therapist                             Physical Therapy Education                 Title: PT OT SLP Therapies (Done)     Topic: Physical Therapy (Done)     Point: Mobility training (Done)     Learning Progress Summary           Patient Acceptance, E,TB, VU,DU by EVA at 9/13/2022 1103                   Point: Home exercise program (Done)     Learning Progress Summary           Patient Acceptance, E,TB, VU,DU by  at 9/13/2022 1103                   Point: Body mechanics (Done)     Learning Progress Summary           Patient Acceptance, E,TB, VU,DU by EVA  at 9/13/2022 1103                   Point: Precautions (Done)     Learning Progress Summary           Patient Acceptance, E,TB, VU,DU by  at 9/13/2022 1103                               User Key     Initials Effective Dates Name Provider Type Discipline     07/25/22 -  Vikki Espino PT Physical Therapist PT              PT Recommendation and Plan     Plan of Care Reviewed With: patient  Outcome Evaluation: Pt is a 61 y/o F POD 1 s/p L post/lat CELSO. Pt reports being I with mobility using a quad cane. Pt lives with spouse and has ramp installed. Pt completed CELSO protocol x 10 prior to functional mobility. Pt stood and ambulated 125' c RW requiring CGA. Pt presents with slowed pace and antalgic step-to pattern. PT provided verbal cues to avoid excessive ER. Pt required min A with L LE to return to supine. PT provided education regarding precautions and progression of PT. PT recommends home with assist and HHPT. Pt will benefit from skilled PT to address strength, ROM, and functional mobility.     Time Calculation:    PT Charges     Row Name 09/13/22 1104             Time Calculation    Start Time 1005  -CS      Stop Time 1047  -CS      Time Calculation (min) 42 min  -CS      PT Received On 09/13/22  -      PT - Next Appointment 09/14/22  -      PT Goal Re-Cert Due Date 09/20/22  -              Time Calculation- PT    Total Timed Code Minutes- PT 40 minute(s)  -CS              Timed Charges    09873 - Gait Training Minutes  15  -CS      85735 - PT Therapeutic Activity Minutes 25  -CS              Total Minutes    Timed Charges Total Minutes 40  -CS       Total Minutes 40  -CS            User Key  (r) = Recorded By, (t) = Taken By, (c) = Cosigned By    Initials Name Provider Type    CS Vikki Espino PT Physical Therapist              Therapy Charges for Today     Code Description Service Date Service Provider Modifiers Qty    79279683656 HC GAIT TRAINING EA 15 MIN 9/13/2022 Vikki Espino, PT GP 1     31970361336 HC PT THERAPEUTIC ACT EA 15 MIN 9/13/2022 Lynn Espino, PT GP 2    88383369579 HC PT EVAL LOW COMPLEXITY 2 9/13/2022 Lynn Espino, PT GP 1          PT G-Codes  Outcome Measure Options: AM-PAC 6 Clicks Basic Mobility (PT)  AM-PAC 6 Clicks Score (PT): 19    LYNN ESPINO, PT  9/13/2022

## 2022-09-13 NOTE — ANESTHESIA POSTPROCEDURE EVALUATION
"Patient: Breana Arreola    Procedure Summary     Date: 09/12/22 Room / Location:  [facility] [address] [place] [address] [facility]    Anesthesia Start: 1406 Anesthesia Stop: 1617    Procedure: TOTAL HIP ARTHROPLASTY (Left Hip) Diagnosis:       Primary osteoarthritis of left hip      (Primary osteoarthritis of left hip [M16.12])    Surgeons: Michelet Peng MD Provider: Erick Burnham MD    Anesthesia Type: general ASA Status: 3          Anesthesia Type: general    Vitals  Vitals Value Taken Time   /78 09/12/22 1945   Temp 36.9 °C (98.5 °F) 09/12/22 1845   Pulse 86 09/12/22 1952   Resp 19 09/12/22 1845   SpO2 96 % 09/12/22 1952   Vitals shown include unvalidated device data.        Post Anesthesia Care and Evaluation    Patient participation: complete - patient participated  Level of consciousness: awake and alert  Pain management: adequate    Airway patency: patent  Anesthetic complications: No anesthetic complications  PONV Status: controlled  Cardiovascular status: acceptable  Respiratory status: acceptable  Hydration status: acceptable    Comments: /79   Pulse 69   Temp 36.2 °C (97.2 °F) (Oral)   Resp 16   Ht 167.6 cm (65.98\")   Wt 116 kg (255 lb 11.7 oz)   SpO2 99%   BMI 41.30 kg/m²         "

## 2022-09-14 ENCOUNTER — TELEPHONE (OUTPATIENT)
Dept: ORTHOPEDIC SURGERY | Facility: CLINIC | Age: 62
End: 2022-09-14

## 2022-09-14 ENCOUNTER — TRANSITIONAL CARE MANAGEMENT TELEPHONE ENCOUNTER (OUTPATIENT)
Dept: CALL CENTER | Facility: HOSPITAL | Age: 62
End: 2022-09-14

## 2022-09-14 RX ORDER — CYCLOBENZAPRINE HCL 10 MG
10 TABLET ORAL NIGHTLY PRN
Qty: 30 TABLET | Refills: 0 | Status: SHIPPED | OUTPATIENT
Start: 2022-09-14

## 2022-09-14 NOTE — OUTREACH NOTE
Prep Survey    Flowsheet Row Responses   [facility] patient discharged from? [place]   Is LACE score < 7 ? Yes   Emergency Room discharge w/ pulse ox? No   Eligibility [facility]   Date of Admission 09/12/22   Date of Discharge 09/13/22   Discharge Disposition Home or Self Care   Discharge diagnosis TOTAL HIP ARTHROPLASTY    Does the patient have one of the following disease processes/diagnoses(primary or secondary)? Total Joint Replacement   Does the patient have Home health ordered? Yes   What is the Home health agency?  [facility]   Is there a DME ordered? No   Prep survey completed? Yes          PAUL REEVES - Registered Nurse

## 2022-09-14 NOTE — OUTREACH NOTE
Call Center TCM Note    Flowsheet Row Responses   [facility] patient discharged from? [place]   Does the patient have one of the following disease processes/diagnoses(primary or secondary)? Total Joint Replacement   Joint surgery performed? Hip   TCM attempt successful? Yes  [no verbal release]   Call start time 0830   Call end time 0841   Discharge diagnosis TOTAL HIP ARTHROPLASTY    Does the patient have all medications related to this admission filled (includes all antibiotics, pain medications, etc.) Yes   Is the patient taking all medications as directed (includes completed medication regime)? Yes   Is the patient able to teach back alternate methods of pain control? Ice, Reposition, Short, frequent activity   What is the Home health agency?  [facility]   Has home health visited the patient within 72 hours of discharge? Unsure   Home health comments Patient reports [facility] to call night prior to visit but did contact her while hospitalized   Psychosocial issues? No   When is the first therapy visit scheduled (PO Day) including how many days per week  Uncertain at time of call   Has the patient began therapy sessions (either in the home or as an out patient)? No   Does the patient have a wound vac in place? Yes   Has the patient fallen since discharge? No   Did the patient receive a copy of their discharge instructions? Yes   Nursing interventions Reviewed instructions with patient   What is the patient's perception of their functional status since discharge? Same  [Patient reports some pain mgmt issues once return to home--reports she is having some issues with sciatica flare to right hip and questions addition of muscle relaxers-encouraged to call her surgeon.  Experienced left heel numbness, improved w/elevation.]   Is the patient able to teach back signs and symptoms of infection? Incisional drainage, Blisters around incision, Increased swelling or redness around incision (not associated with surgical  edema), Severe discomfort or pain, Changes in mobility, Shortness of breath or chest pain, Temp >100.4 for 24h or longer   Is the patient able to teach back how to prevent infection? Check incision daily, Wash hands before and after touching incision, No lotion or creams, No tub baths, hot tub or swimming   Is the patient able to teach back signs and symptoms of DVT? Redness in calf, Severe pain in calf, Swelling in calf, Shortness of breath or chest pain, Area hot to touch  [Reviewed with patient]   Is the patient/caregiver able to teach back the hierarchy of who to call/visit for symptoms/problems? PCP, Specialist, Home health nurse, Urgent Care, ED, 911 Yes   Additional teach back comments Routine post op care reviewed--using IS as ordered. Had some nausea, alleviated w/zofran.  Anticipating call from P.T. to start therapy.   TCM call completed? Yes          Sangeetha Hill RN    9/14/2022, 08:49 EDT

## 2022-09-14 NOTE — TELEPHONE ENCOUNTER
This should be temporary as she had a hip replacement and her alignment is slightly changed from where she was before surgery.  Having lower back pain after hip replacement can be common.  Tell her to take her pain medicine as prescribed and if she is having muscle spasms then we can give her a muscle relaxer.  I would not give her a muscle relaxer just because she is having back symptoms.

## 2022-09-14 NOTE — TELEPHONE ENCOUNTER
Patient called regarding pain level, said her back is bothering her. Would like to know if she can take either additional pain medications or be prescribed a muscle relaxer to help

## 2022-09-19 ENCOUNTER — TELEPHONE (OUTPATIENT)
Dept: ORTHOPEDIC SURGERY | Facility: HOSPITAL | Age: 62
End: 2022-09-19

## 2022-09-19 NOTE — TELEPHONE ENCOUNTER
Called and spoke with Ms. Arreola to see how she is doing as she is 1 week SP CELSO. She said things are going well and she is progressing. She is still working with HH PT and they are going to start practicing stairs. Pain is controlled, she is weaning off the pain medication. She said she is still having some back/sciatica issues but, that's something else entirely. Dressing looks good. She is walking more. Ms. Arreola doesn't have any questions for me at this time. She was given my contact information should she need anything. She voiced understanding and appreciated the call.

## 2022-09-27 ENCOUNTER — OFFICE VISIT (OUTPATIENT)
Dept: ORTHOPEDIC SURGERY | Facility: CLINIC | Age: 62
End: 2022-09-27

## 2022-09-27 VITALS — BODY MASS INDEX: 36.16 KG/M2 | HEIGHT: 66 IN | WEIGHT: 225 LBS | TEMPERATURE: 96.9 F

## 2022-09-27 DIAGNOSIS — R52 PAIN: Primary | ICD-10-CM

## 2022-09-27 DIAGNOSIS — Z98.890 STATUS POST HIP SURGERY: ICD-10-CM

## 2022-09-27 PROCEDURE — 99024 POSTOP FOLLOW-UP VISIT: CPT | Performed by: ORTHOPAEDIC SURGERY

## 2022-09-27 PROCEDURE — 73501 X-RAY EXAM HIP UNI 1 VIEW: CPT | Performed by: ORTHOPAEDIC SURGERY

## 2022-09-27 RX ORDER — HYDROCODONE BITARTRATE AND ACETAMINOPHEN 7.5; 325 MG/1; MG/1
TABLET ORAL
Qty: 15 TABLET | Refills: 0 | Status: SHIPPED | OUTPATIENT
Start: 2022-09-27

## 2022-09-27 NOTE — PROGRESS NOTES
Breana Arreola : 1960 MRN: [id number] DATE: 2022    DIAGNOSIS: 2 week follow up left total hip     SUBJECTIVE:Patient returns today for 2 week follow up of left total hip replacement. Patient reports doing well with no unusual complaints. Appears to be progressing appropriately.    OBJECTIVE:   Exam:. The incision is healing appropriately. No sign of infection. Range of motion is progressing as expected. The calf is soft and nontender with a negative Homans sign.    DIAGNOSTIC STUDIES  Xrays: 2 views of the left hip (AP pelvis and lateral left hip) were ordered and reviewed for evaluation of recent hip replacement. They demonstrate a well positioned, well aligned hip replacement without complicating factors noted. In comparison with previous films there has been interval implant placement.    ASSESSMENT: 2 week status post left hip replacement.    PLAN: 1) Staples removed and steri strips applied   2) PT exercises   3) Discontinue FILEMON hose   4) Continue ice PRN   5) WBAT   6) aspirin 81 mg orally every day for 1 month   7) Follow up in 6 weeks with repeat Xrays of left hip (2views)    Michelet Peng MD  2022

## 2022-11-08 ENCOUNTER — OFFICE VISIT (OUTPATIENT)
Dept: ORTHOPEDIC SURGERY | Facility: CLINIC | Age: 62
End: 2022-11-08

## 2022-11-08 VITALS — BODY MASS INDEX: 40.02 KG/M2 | HEIGHT: 66 IN | WEIGHT: 249 LBS | TEMPERATURE: 97.6 F

## 2022-11-08 DIAGNOSIS — Z98.890 STATUS POST HIP SURGERY: ICD-10-CM

## 2022-11-08 DIAGNOSIS — R52 PAIN: Primary | ICD-10-CM

## 2022-11-08 PROCEDURE — 99024 POSTOP FOLLOW-UP VISIT: CPT | Performed by: NURSE PRACTITIONER

## 2022-11-08 PROCEDURE — 73502 X-RAY EXAM HIP UNI 2-3 VIEWS: CPT | Performed by: NURSE PRACTITIONER

## 2022-11-08 NOTE — PROGRESS NOTES
Breana Arreola : 1960 MRN: [id number] DATE: 2022    DIAGNOSIS: 8 week follow up left total hip (Posterior)    SUBJECTIVE:Patient returns today for 8 week follow up of left total hip replacement. Patient reports doing well with no unusual complaints. Appears to be progressing appropriately and is using a cane.  Patient reports her pain is tolerable but it is worse at night.  She is still going to outpatient physical therapy.  Patient states that she has known right foot issues that causes her to pronate and walk funny.  In regards to her left hip she denies any signs or symptoms of infection, and she is without any other significant plaints today.    OBJECTIVE:   Exam:. The incision is healed. No sign of infection. Range of motion is progressing as expected. The calf is soft and nontender with a negative Homans sign. Strength progressing    DIAGNOSTIC STUDIES  Xrays: 2 views of the left hip (AP pelvis and lateral left hip) were ordered and reviewed for evaluation of recent hip replacement. They demonstrate a well positioned, well aligned hip replacement without complicating factors noted. In comparison with previous films there has been interval implant placement.    ASSESSMENT: 8 week status post left hip replacement.    PLAN: 1) Activity as tolerated   2) Continue hip strengthening exercises    3) Follow up 1 year post-op with repeat Xrays of left hip (2views AP Pelvis      and lateral left hip)    HUGO Eli  2022

## 2022-12-10 DIAGNOSIS — I10 PRIMARY HYPERTENSION: ICD-10-CM

## 2022-12-12 RX ORDER — LISINOPRIL 30 MG/1
30 TABLET ORAL DAILY
Qty: 90 TABLET | Refills: 0 | Status: SHIPPED | OUTPATIENT
Start: 2022-12-12 | End: 2023-03-01

## 2023-03-01 DIAGNOSIS — I10 PRIMARY HYPERTENSION: ICD-10-CM

## 2023-03-01 RX ORDER — LISINOPRIL 30 MG/1
30 TABLET ORAL DAILY
Qty: 90 TABLET | Refills: 0 | Status: SHIPPED | OUTPATIENT
Start: 2023-03-01

## 2023-04-18 RX ORDER — CEFDINIR 300 MG/1
300 CAPSULE ORAL 2 TIMES DAILY
Qty: 20 CAPSULE | Refills: 0 | Status: CANCELLED | OUTPATIENT
Start: 2023-04-18

## 2023-04-18 RX ORDER — CEPHALEXIN 500 MG/1
CAPSULE ORAL
Qty: 4 CAPSULE | Refills: 3 | Status: SHIPPED | OUTPATIENT
Start: 2023-04-18

## 2023-04-18 NOTE — TELEPHONE ENCOUNTER
Caller: Breana Arreola    Relationship: Self    Best call back number:     Requested Prescriptions:   ANTIBIOTIC     Pharmacy where request should be sent:  [facility]     Last office visit with prescribing clinician: 9/27/2022   Last telemedicine visit with prescribing clinician: 9/12/2023   Next office visit with prescribing clinician: 9/12/2023     Additional details provided by patient: APPT ON 4/25/23    Does the patient have less than a 3 day supply:  [x] Yes  [] No    Would you like a call back once the refill request has been completed: [x] Yes [] No    If the office needs to give you a call back, can they leave a voicemail: [x] Yes [] No    Chino Roy Rep   04/18/23 09:53 EDT

## 2023-05-24 DIAGNOSIS — I10 PRIMARY HYPERTENSION: ICD-10-CM

## 2023-05-24 RX ORDER — LISINOPRIL 30 MG/1
30 TABLET ORAL DAILY
Qty: 90 TABLET | Refills: 0 | Status: SHIPPED | OUTPATIENT
Start: 2023-05-24

## 2023-08-30 DIAGNOSIS — I10 PRIMARY HYPERTENSION: ICD-10-CM

## 2023-08-30 RX ORDER — LISINOPRIL 30 MG/1
30 TABLET ORAL DAILY
Qty: 90 TABLET | Refills: 0 | Status: SHIPPED | OUTPATIENT
Start: 2023-08-30

## 2023-09-26 ENCOUNTER — OFFICE VISIT (OUTPATIENT)
Dept: ORTHOPEDIC SURGERY | Facility: CLINIC | Age: 63
End: 2023-09-26
Payer: COMMERCIAL

## 2023-09-26 VITALS — BODY MASS INDEX: 42.47 KG/M2 | WEIGHT: 270.6 LBS | HEIGHT: 67 IN | TEMPERATURE: 97.3 F

## 2023-09-26 DIAGNOSIS — G89.29 CHRONIC PAIN OF RIGHT ANKLE: ICD-10-CM

## 2023-09-26 DIAGNOSIS — Z98.890 STATUS POST HIP SURGERY: ICD-10-CM

## 2023-09-26 DIAGNOSIS — R52 PAIN: Primary | ICD-10-CM

## 2023-09-26 DIAGNOSIS — M25.571 CHRONIC PAIN OF RIGHT ANKLE: ICD-10-CM

## 2023-09-26 DIAGNOSIS — M54.50 BILATERAL LOW BACK PAIN WITHOUT SCIATICA, UNSPECIFIED CHRONICITY: ICD-10-CM

## 2023-09-26 PROCEDURE — 99213 OFFICE O/P EST LOW 20 MIN: CPT | Performed by: NURSE PRACTITIONER

## 2023-09-26 NOTE — PROGRESS NOTES
"Patient: Breana Arreola  YOB: 1960 63 y.o. female  Medical Record Number: [id number]    Chief Complaints:   Chief Complaint   Patient presents with    Left Hip - Follow-up       History of Present Illness:Breana Arreola is a 63 y.o. female who presents for follow-up for left total hip replacement, she is 1 year out, hips doing great, she does have some occasional pain in her lower back, has severe arthritic changes as well as scoliosis, she reports that this time\" its not that bad\" she is also having some pain in her right ankle and is thinking about seeing a specialist.  She had previous left ankle surgery at age 9    Allergies: No Known Allergies    Medications:   Current Outpatient Medications   Medication Sig Dispense Refill    lisinopril (PRINIVIL,ZESTRIL) 30 MG tablet TAKE 1 TABLET BY MOUTH DAILY 90 tablet 0     No current facility-administered medications for this visit.         The following portions of the patient's history were reviewed and updated as appropriate: allergies, current medications, past family history, past medical history, past social history, past surgical history and problem list.    Review of Systems:   14 point review of systems was performed. All systems negative except pertinent positives/negatives listed in HPI above    Physical Exam:   Vitals:    09/26/23 1534   Temp: 97.3 °F (36.3 °C)   Weight: 123 kg (270 lb 9.6 oz)   Height: 170.2 cm (67\")   PainSc: 0-No pain   PainLoc: Hip       General: A and O x 3, ASA, NAD    Skin clear no unusual lesions noted  Left hip patient is nontender palpation she has excellent range of motion no instability calf soft and nontender      Radiology:  Xrays 2 views of left hip ordered and reviewed today secondary to previous surgery show well-placed well-positioned left total hip replacement.  Comparative views are unchanged    Assessment/Plan: Status post left CELSO stable  Lumbar scoliosis and arthritis  right ankle pain    Patient and I " discussed options, with regards to the hip, she is doing great, with regards to her back we did briefly discuss additional work-up and having patient see Marleen Wakefield if needed, she would like to hold off her main complaint today is her right ankle, Dr. Peng also saw the patient and discussed her perhaps seeing Dr. Rao.  We will otherwise see her back as needed, Tylenol as needed      Lakeisha Smith, APRN  9/26/2023

## 2023-10-23 RX ORDER — CEPHALEXIN 500 MG/1
CAPSULE ORAL
Qty: 4 CAPSULE | Refills: 0 | Status: SHIPPED | OUTPATIENT
Start: 2023-10-23

## 2023-10-23 NOTE — TELEPHONE ENCOUNTER
Caller: SULMA   Relationship to Patient: SELF  Phone Number: [phone]  Reason for Call: PATIENT CALLING ASKING FOR A SCRIPT FOR ANTIBIOTICS FOR DENTAL WORK SENT TO [facility]

## 2023-11-27 ENCOUNTER — OFFICE VISIT (OUTPATIENT)
Dept: FAMILY MEDICINE CLINIC | Facility: CLINIC | Age: 63
End: 2023-11-27
Payer: COMMERCIAL

## 2023-11-27 VITALS
TEMPERATURE: 97.1 F | BODY MASS INDEX: 42.38 KG/M2 | WEIGHT: 270 LBS | HEART RATE: 70 BPM | HEIGHT: 67 IN | DIASTOLIC BLOOD PRESSURE: 86 MMHG | OXYGEN SATURATION: 96 % | SYSTOLIC BLOOD PRESSURE: 148 MMHG

## 2023-11-27 DIAGNOSIS — R73.03 PREDIABETES: ICD-10-CM

## 2023-11-27 DIAGNOSIS — E78.5 HYPERLIPIDEMIA, UNSPECIFIED HYPERLIPIDEMIA TYPE: ICD-10-CM

## 2023-11-27 DIAGNOSIS — I10 PRIMARY HYPERTENSION: Primary | ICD-10-CM

## 2023-11-27 DIAGNOSIS — E66.01 CLASS 3 SEVERE OBESITY WITHOUT SERIOUS COMORBIDITY WITH BODY MASS INDEX (BMI) OF 40.0 TO 44.9 IN ADULT, UNSPECIFIED OBESITY TYPE: ICD-10-CM

## 2023-11-27 PROCEDURE — 99214 OFFICE O/P EST MOD 30 MIN: CPT | Performed by: FAMILY MEDICINE

## 2023-11-27 RX ORDER — SPIRONOLACTONE 100 MG/1
100 TABLET, FILM COATED ORAL DAILY
COMMUNITY
Start: 2023-11-10

## 2023-11-27 RX ORDER — KETOCONAZOLE 20 MG/ML
SHAMPOO TOPICAL
COMMUNITY
Start: 2023-11-10

## 2023-11-27 RX ORDER — LISINOPRIL 30 MG/1
30 TABLET ORAL DAILY
Qty: 90 TABLET | Refills: 2 | Status: SHIPPED | OUTPATIENT
Start: 2023-11-27

## 2023-11-27 NOTE — PROGRESS NOTES
"    Chief Complaint  Hypertension (Doesn't take til 5 b/p pill)    Subjective    History of Present Illness {CC  Problem List  Visit  Diagnosis   Encounters  Notes  Medications  Labs  Result Review Imaging  Media :23}     Breana Arreola presents to [facility] for   History of Present Illness   62 yo female present for follow up visit. Had a biometric type evaluation through her work a few weeks ago. Has lab results.   BP doing well at the time of most recent evaluation , not checking at home often  Taking medication as prescribed.       Social History     Socioeconomic History    Marital status:    Tobacco Use    Smoking status: Never    Smokeless tobacco: Never   Vaping Use    Vaping Use: Never used   Substance and Sexual Activity    Alcohol use: Yes     Comment: FEW DRINKS OVER WEEKEND    Drug use: No    Sexual activity: Yes     Partners: Male     Comment:       Objective     Vital Signs:   /86   Pulse 70   Temp 97.1 °F (36.2 °C)   Ht 170.2 cm (67\")   Wt 122 kg (270 lb)   SpO2 96%   BMI 42.29 kg/m²   Physical Exam  Constitutional:       General: She is not in acute distress.     Appearance: She is obese. She is not ill-appearing.   HENT:      Head: Normocephalic.   Cardiovascular:      Rate and Rhythm: Regular rhythm.      Heart sounds: Normal heart sounds.   Pulmonary:      Breath sounds: Normal breath sounds. No wheezing.   Musculoskeletal:      Right lower leg: No edema.      Left lower leg: No edema.      Comments: Club R foot   Neurological:      Mental Status: She is alert.        Result Review  Data Reviewed:{ Labs  Result Review  Imaging  Med Tab  Media :23}   The following data was reviewed by: Sharona Kirkland MD on 11/27/2023  Lab Results - Last 18 Months   Lab Units 09/13/22  0530 09/01/22  0836   BUN mg/dL  --  12   CREATININE mg/dL  --  0.66   SODIUM mmol/L  --  141   POTASSIUM mmol/L  --  5.0   CHLORIDE mmol/L  --  107   CALCIUM " mg/dL  --  9.0   WBC 10*3/mm3  --  6.04   RBC 10*6/mm3  --  4.27   HEMATOCRIT % 37.5 39.2   MCV fL  --  91.8   MCH pg  --  31.1              Current Outpatient Medications:     ketoconazole (NIZORAL) 2 % shampoo, , Disp: , Rfl:     lisinopril (PRINIVIL,ZESTRIL) 30 MG tablet, TAKE 1 TABLET BY MOUTH DAILY, Disp: 90 tablet, Rfl: 0    spironolactone (ALDACTONE) 100 MG tablet, Take 1 tablet by mouth Daily. (Patient not taking: Reported on 11/27/2023), Disp: , Rfl:       Assessment and Plan {CC Problem List  Visit Diagnosis  ROS  Review (Popup)  Health Maintenance  Quality  BestPractice  Medications  SmartSets  SnapShot Encounters  Media :23}   Problem List Items Addressed This Visit       Hypertension - Primary    Current Assessment & Plan     Hypertension is  chronic, stable on medication  .  Continue current treatment regimen.  Dietary sodium restriction.  Regular aerobic exercise.  Continue current medications.  Ambulatory blood pressure monitoring.  Biometric bp normal  Refilled medication ,   Not taking spironolactone, given by dermatology to help with acne, which is improving without medication  Blood pressure will be reassessed in 3 months.         Relevant Medications    spironolactone (ALDACTONE) 100 MG tablet    lisinopril (PRINIVIL,ZESTRIL) 30 MG tablet    Adiposity    Prediabetes    Hyperlipidemia    Current Assessment & Plan     Lipid abnormalities are  chronic, elevated with work biometric testing .  Pt advise to loose weigh, low fat high fiber diet. Exercise at least 30 min daily. Pt due to retire end of year, goal to make lifestyle changes.   Lipids will be reassessed in 3 months.          Discussed lifestyle, diet ,exercise changes to help with chronic conditions.       Follow Up   Return in about 3 months (around 2/27/2024) for with new provider , Annual physical.  Patient was given instructions and counseling regarding her condition or for health maintenance advice. Please see specific  information pulled into the AVS if appropriate.    EpicAct:[email or web address]_ORDERS,RunParams:STARTUPTYPE=FOLLOW    [email or web address]_DISCHARGE

## 2023-11-27 NOTE — ASSESSMENT & PLAN NOTE
Hypertension is  chronic, stable on medication  .  Continue current treatment regimen.  Dietary sodium restriction.  Regular aerobic exercise.  Continue current medications.  Ambulatory blood pressure monitoring.  Biometric bp normal  Refilled medication ,   Not taking spironolactone, given by dermatology to help with acne, which is improving without medication  Blood pressure will be reassessed in 3 months.

## 2023-11-27 NOTE — ASSESSMENT & PLAN NOTE
Lipid abnormalities are  chronic, elevated with work biometric testing .  Pt advise to loose weigh, low fat high fiber diet. Exercise at least 30 min daily. Pt due to retire end of year, goal to make lifestyle changes.   Lipids will be reassessed in 3 months.

## 2024-03-12 ENCOUNTER — OFFICE VISIT (OUTPATIENT)
Dept: INTERNAL MEDICINE | Facility: CLINIC | Age: 64
End: 2024-03-12
Payer: COMMERCIAL

## 2024-03-12 VITALS
BODY MASS INDEX: 41.44 KG/M2 | SYSTOLIC BLOOD PRESSURE: 130 MMHG | HEART RATE: 87 BPM | OXYGEN SATURATION: 96 % | HEIGHT: 67 IN | WEIGHT: 264 LBS | DIASTOLIC BLOOD PRESSURE: 80 MMHG

## 2024-03-12 DIAGNOSIS — E66.01 MORBID OBESITY WITH BMI OF 40.0-44.9, ADULT: ICD-10-CM

## 2024-03-12 DIAGNOSIS — E55.9 VITAMIN D DEFICIENCY: ICD-10-CM

## 2024-03-12 DIAGNOSIS — M79.671 RIGHT FOOT PAIN: ICD-10-CM

## 2024-03-12 DIAGNOSIS — R73.03 PREDIABETES: ICD-10-CM

## 2024-03-12 DIAGNOSIS — E78.2 MIXED HYPERLIPIDEMIA: ICD-10-CM

## 2024-03-12 DIAGNOSIS — I10 PRIMARY HYPERTENSION: Primary | ICD-10-CM

## 2024-03-12 PROBLEM — E66.9 ADIPOSITY: Status: RESOLVED | Noted: 2017-08-04 | Resolved: 2024-03-12

## 2024-03-12 PROBLEM — R79.9 ABNORMAL BLOOD CHEMISTRY: Status: RESOLVED | Noted: 2017-08-04 | Resolved: 2024-03-12

## 2024-03-12 RX ORDER — LISINOPRIL 30 MG/1
30 TABLET ORAL DAILY
Qty: 90 TABLET | Refills: 2 | Status: SHIPPED | OUTPATIENT
Start: 2024-03-12

## 2024-03-12 RX ORDER — DIPHENOXYLATE HYDROCHLORIDE AND ATROPINE SULFATE 2.5; .025 MG/1; MG/1
TABLET ORAL DAILY
COMMUNITY

## 2024-03-12 NOTE — ASSESSMENT & PLAN NOTE
Lipid abnormalities are stable    Plan:  Lipid lowering therapy not prescribed due to lifestyle modification    Counseled patient on lifestyle modifications to help control hyperlipidemia.     Patient Treatment Goals:   LDL goal is under 100    Followup at the next regular appointment.

## 2024-03-12 NOTE — ASSESSMENT & PLAN NOTE
Last A1c 6.2 11/2024  Continue with low glycemic diet choices  Recommended 150-300 min weekly exercise

## 2024-03-12 NOTE — PROGRESS NOTES
"Chief Complaint  Hypertension and Weight Loss    Subjective        Breana Arreola presents to [facility]  History of Present Illness  This is a 64 y/o female presenting to office to establish care and for f/u with chronic health conditions. Patient reports she recently retired. Patient is currently .     Patient is currently working on going back to the gym. Reports following healthy diet choices. Reports she is trying to do some intermittent fasting along with some lower carb diet options. Reports she does not follow a formal diet plan; reports she has been trying to add vegetables to every meal.    Denies tobacco use.     Pap smear completed 2023; post menopausal. Mammogram completed 11/2023.     Patient reports she has some right foot issue; she is working on going to see Dr. Rao with foot and ankle orthopedics. Reports she does not take any medication OTC.     HTN-- continues on lisinopril; denies checking BP at home; denies CP or SOA.     Reports following with dermatology for hx of hormonal based acne. Reports she was placed on aldactone but this has not been helping.     Objective   Vital Signs:  /80 (BP Location: Left arm, Patient Position: Sitting, Cuff Size: Adult)   Pulse 87   Ht 170.2 cm (67\")   Wt 120 kg (264 lb)   SpO2 96%   BMI 41.35 kg/m²   Estimated body mass index is 41.35 kg/m² as calculated from the following:    Height as of this encounter: 170.2 cm (67\").    Weight as of this encounter: 120 kg (264 lb).       Class 3 Severe Obesity (BMI >=40). Obesity-related health conditions include the following: hypertension and dyslipidemias. Obesity is improving with lifestyle modifications. BMI is is above average; BMI management plan is completed. We discussed portion control, increasing exercise, joining a fitness center or start home based exercise program, Weight Watchers or other Commercial based weight reduction program, and an jonathan-based approach " such as MyFitness Pal or Lose It.      Physical Exam  Constitutional:       General: She is awake.      Appearance: Normal appearance.   HENT:      Head: Normocephalic and atraumatic.      Right Ear: Hearing and external ear normal.      Left Ear: Hearing and external ear normal.      Nose: Nose normal.      Mouth/Throat:      Lips: Pink.      Mouth: Mucous membranes are moist.      Pharynx: Oropharynx is clear.   Eyes:      Pupils: Pupils are equal, round, and reactive to light.   Neck:      Vascular: No carotid bruit.   Cardiovascular:      Rate and Rhythm: Normal rate and regular rhythm.      Pulses: Normal pulses.      Heart sounds: Normal heart sounds. No murmur heard.     No friction rub. No gallop.   Pulmonary:      Effort: Pulmonary effort is normal. No respiratory distress.      Breath sounds: Normal breath sounds. No stridor. No wheezing, rhonchi or rales.   Musculoskeletal:      Cervical back: Normal range of motion and neck supple.   Skin:     Capillary Refill: Capillary refill takes less than 2 seconds.   Neurological:      General: No focal deficit present.      Mental Status: She is alert and oriented to person, place, and time. Mental status is at baseline.      Motor: Motor function is intact.      Coordination: Coordination is intact.      Gait: Gait is intact.      Deep Tendon Reflexes: Reflexes are normal and symmetric.   Psychiatric:         Attention and Perception: Attention normal.         Mood and Affect: Mood normal.         Speech: Speech normal.         Behavior: Behavior normal. Behavior is cooperative.         Thought Content: Thought content normal.         Cognition and Memory: Cognition normal.         Judgment: Judgment normal.        Result Review :    The following data was reviewed by: HUGO Murdock on 03/12/2024:    Social History     Substance and Sexual Activity   Alcohol Use Yes    Comment: FEW DRINKS OVER WEEKEND     Tobacco Use: Low Risk  (3/12/2024)    Patient  History     Smoking Tobacco Use: Never     Smokeless Tobacco Use: Never     Passive Exposure: Not on file     Family History   Problem Relation Age of Onset    Lung cancer Mother     Lung cancer Father     Malig Hyperthermia Neg Hx                   Assessment and Plan     Diagnoses and all orders for this visit:    1. Primary hypertension (Primary)  Assessment & Plan:  Hypertension is stable and controlled  Continue current treatment regimen.  Dietary sodium restriction.  Weight loss.  Regular aerobic exercise.  Blood pressure will be reassessed in 6 months.    Orders:  -     lisinopril (PRINIVIL,ZESTRIL) 30 MG tablet; Take 1 tablet by mouth Daily.  Dispense: 90 tablet; Refill: 2    2. Mixed hyperlipidemia  Assessment & Plan:   Lipid abnormalities are stable    Plan:  Lipid lowering therapy not prescribed due to lifestyle modification    Counseled patient on lifestyle modifications to help control hyperlipidemia.     Patient Treatment Goals:   LDL goal is under 100    Followup at the next regular appointment.      3. Vitamin D deficiency  Assessment & Plan:  Continues on vitamin D supplementation      4. Prediabetes  Assessment & Plan:  Last A1c 6.2 11/2024  Continue with low glycemic diet choices  Recommended 150-300 min weekly exercise      5. Morbid obesity with BMI of 40.0-44.9, adult  Assessment & Plan:  Class 3 Severe Obesity (BMI >=40). Obesity-related health conditions include the following: hypertension and dyslipidemias. Obesity is improving with lifestyle modifications. BMI is is above average; BMI management plan is completed. We discussed portion control, increasing exercise, joining a fitness center or start home based exercise program, Weight Watchers or other Commercial based weight reduction program, and an jonathan-based approach such as Glassful Pal or Lose It.      6. Right foot pain  Assessment & Plan:  Chronic in nature  Recommended close f/u with Dr. Rao             I spent 41 minutes caring  anthony Toussaint on this date of service. This time includes time spent by me in the following activities:preparing for the visit, reviewing tests, obtaining and/or reviewing a separately obtained history, performing a medically appropriate examination and/or evaluation , counseling and educating the patient/family/caregiver, ordering medications, tests, or procedures, documenting information in the medical record, and care coordination  Follow Up     Return in about 8 months (around 11/18/2024) for Annual physical.  Patient was given instructions and counseling regarding her condition or for health maintenance advice. Please see specific information pulled into the AVS if appropriate.

## 2024-03-12 NOTE — ASSESSMENT & PLAN NOTE
Class 3 Severe Obesity (BMI >=40). Obesity-related health conditions include the following: hypertension and dyslipidemias. Obesity is improving with lifestyle modifications. BMI is is above average; BMI management plan is completed. We discussed portion control, increasing exercise, joining a fitness center or start home based exercise program, Weight Watchers or other Commercial based weight reduction program, and an jonathan-based approach such as trippiece Pal or Lose It.  Information given about wegovy, saxenda, and zepbound today. Patient advised wegovy and saxenda are currently on national back order. It is difficult to find these doses at this time.

## 2024-03-13 ENCOUNTER — PATIENT ROUNDING (BHMG ONLY) (OUTPATIENT)
Dept: INTERNAL MEDICINE | Facility: CLINIC | Age: 64
End: 2024-03-13
Payer: COMMERCIAL

## 2024-04-22 DIAGNOSIS — Z98.890 STATUS POST HIP SURGERY: Primary | ICD-10-CM

## 2024-04-22 RX ORDER — CEPHALEXIN 500 MG/1
CAPSULE ORAL
Qty: 4 CAPSULE | Refills: 0 | Status: SHIPPED | OUTPATIENT
Start: 2024-04-22

## 2024-04-22 NOTE — TELEPHONE ENCOUNTER
Provider:     Caller: SULMA PARADA    Relationship to Patient: SELF    Pharmacy: [id number] - [place], [place] [address]  - [phone]  - [phone] FX    Phone Number: [phone]    Reason for Call: PRIOR TOTAL HIP REPLACEMENT. REQUESTING ANTIBIOTICS FOR DENTAL WORK TOMORROW MORNING (04/23/24). PLEASE ADVISE.

## 2025-04-07 DIAGNOSIS — I10 PRIMARY HYPERTENSION: ICD-10-CM

## 2025-04-07 RX ORDER — LISINOPRIL 30 MG/1
30 TABLET ORAL DAILY
Qty: 90 TABLET | Refills: 0 | Status: SHIPPED | OUTPATIENT
Start: 2025-04-07

## 2025-06-30 DIAGNOSIS — I10 PRIMARY HYPERTENSION: ICD-10-CM

## 2025-06-30 RX ORDER — LISINOPRIL 30 MG/1
30 TABLET ORAL DAILY
Qty: 90 TABLET | Refills: 1 | Status: SHIPPED | OUTPATIENT
Start: 2025-06-30

## 2025-06-30 NOTE — TELEPHONE ENCOUNTER
Rx Refill Note  Requested Prescriptions     Pending Prescriptions Disp Refills    lisinopril (PRINIVIL,ZESTRIL) 30 MG tablet [Pharmacy Med Name: Lisinopril Oral Tablet 30 MG] 90 tablet 0     Sig: TAKE 1 TABLET BY MOUTH DAILY      Last office visit with prescribing clinician: 3/12/2024   Last telemedicine visit with prescribing clinician: Visit date not found   Next office visit with prescribing clinician: 7/9/2025

## 2025-08-22 ENCOUNTER — OFFICE VISIT (OUTPATIENT)
Dept: INTERNAL MEDICINE | Facility: CLINIC | Age: 65
End: 2025-08-22
Payer: COMMERCIAL

## 2025-08-22 VITALS
HEIGHT: 67 IN | WEIGHT: 262 LBS | OXYGEN SATURATION: 97 % | HEART RATE: 91 BPM | BODY MASS INDEX: 41.12 KG/M2 | SYSTOLIC BLOOD PRESSURE: 110 MMHG | DIASTOLIC BLOOD PRESSURE: 82 MMHG

## 2025-08-22 DIAGNOSIS — I10 PRIMARY HYPERTENSION: Chronic | ICD-10-CM

## 2025-08-22 DIAGNOSIS — R73.03 PREDIABETES: Chronic | ICD-10-CM

## 2025-08-22 DIAGNOSIS — Z12.31 SCREENING MAMMOGRAM FOR BREAST CANCER: ICD-10-CM

## 2025-08-22 DIAGNOSIS — Z12.11 ENCOUNTER FOR SCREENING COLONOSCOPY: ICD-10-CM

## 2025-08-22 DIAGNOSIS — E78.2 MIXED HYPERLIPIDEMIA: Chronic | ICD-10-CM

## 2025-08-22 DIAGNOSIS — L70.9 ADULT ACNE: Chronic | ICD-10-CM

## 2025-08-22 DIAGNOSIS — Z00.00 ANNUAL PHYSICAL EXAM: Primary | ICD-10-CM

## 2025-08-22 DIAGNOSIS — Z85.828 HISTORY OF BASAL CELL CARCINOMA (BCC) OF SKIN: ICD-10-CM

## 2025-08-22 DIAGNOSIS — E55.9 VITAMIN D DEFICIENCY: Chronic | ICD-10-CM

## 2025-08-22 LAB
ALBUMIN SERPL-MCNC: 4.7 G/DL (ref 3.5–5.2)
ALBUMIN/GLOB SERPL: 1.4 G/DL
ALP SERPL-CCNC: 113 U/L (ref 39–117)
ALT SERPL-CCNC: 23 U/L (ref 1–33)
AST SERPL-CCNC: 23 U/L (ref 1–32)
BASOPHILS # BLD AUTO: 0.06 10*3/MM3 (ref 0–0.2)
BASOPHILS NFR BLD AUTO: 0.5 % (ref 0–1.5)
BILIRUB SERPL-MCNC: 0.4 MG/DL (ref 0–1.2)
BUN SERPL-MCNC: 24 MG/DL (ref 8–23)
BUN/CREAT SERPL: 24.5 (ref 7–25)
CALCIUM SERPL-MCNC: 10.5 MG/DL (ref 8.6–10.5)
CHLORIDE SERPL-SCNC: 99 MMOL/L (ref 98–107)
CHOLEST SERPL-MCNC: 198 MG/DL (ref 0–200)
CO2 SERPL-SCNC: 23.2 MMOL/L (ref 22–29)
CREAT SERPL-MCNC: 0.98 MG/DL (ref 0.57–1)
EGFRCR SERPLBLD CKD-EPI 2021: 65.4 ML/MIN/1.73
EOSINOPHIL # BLD AUTO: 0.1 10*3/MM3 (ref 0–0.4)
EOSINOPHIL NFR BLD AUTO: 0.8 % (ref 0.3–6.2)
ERYTHROCYTE [DISTWIDTH] IN BLOOD BY AUTOMATED COUNT: 12.4 % (ref 12.3–15.4)
GLOBULIN SER CALC-MCNC: 3.3 GM/DL
GLUCOSE SERPL-MCNC: 113 MG/DL (ref 65–99)
HBA1C MFR BLD: 6 % (ref 4.8–5.6)
HCT VFR BLD AUTO: 43.6 % (ref 34–46.6)
HDLC SERPL-MCNC: 58 MG/DL (ref 40–60)
HGB BLD-MCNC: 14.5 G/DL (ref 12–15.9)
IMM GRANULOCYTES # BLD AUTO: 0.02 10*3/MM3 (ref 0–0.05)
IMM GRANULOCYTES NFR BLD AUTO: 0.2 % (ref 0–0.5)
LDLC SERPL CALC-MCNC: 120 MG/DL (ref 0–100)
LYMPHOCYTES # BLD AUTO: 1.36 10*3/MM3 (ref 0.7–3.1)
LYMPHOCYTES NFR BLD AUTO: 11.4 % (ref 19.6–45.3)
MCH RBC QN AUTO: 32.1 PG (ref 26.6–33)
MCHC RBC AUTO-ENTMCNC: 33.3 G/DL (ref 31.5–35.7)
MCV RBC AUTO: 96.5 FL (ref 79–97)
MONOCYTES # BLD AUTO: 0.96 10*3/MM3 (ref 0.1–0.9)
MONOCYTES NFR BLD AUTO: 8 % (ref 5–12)
NEUTROPHILS # BLD AUTO: 9.46 10*3/MM3 (ref 1.7–7)
NEUTROPHILS NFR BLD AUTO: 79.1 % (ref 42.7–76)
NRBC BLD AUTO-RTO: 0 /100 WBC (ref 0–0.2)
PLATELET # BLD AUTO: 396 10*3/MM3 (ref 140–450)
POTASSIUM SERPL-SCNC: 5.7 MMOL/L (ref 3.5–5.2)
PROT SERPL-MCNC: 8 G/DL (ref 6–8.5)
RBC # BLD AUTO: 4.52 10*6/MM3 (ref 3.77–5.28)
SODIUM SERPL-SCNC: 136 MMOL/L (ref 136–145)
TRIGL SERPL-MCNC: 113 MG/DL (ref 0–150)
TSH SERPL DL<=0.005 MIU/L-ACNC: 1.67 UIU/ML (ref 0.27–4.2)
VLDLC SERPL CALC-MCNC: 20 MG/DL (ref 5–40)
WBC # BLD AUTO: 11.96 10*3/MM3 (ref 3.4–10.8)

## 2025-08-22 RX ORDER — SPIRONOLACTONE 50 MG/1
50 TABLET, FILM COATED ORAL DAILY
Qty: 90 TABLET | Refills: 1 | Status: SHIPPED | OUTPATIENT
Start: 2025-08-22

## 2025-08-22 RX ORDER — SPIRONOLACTONE 100 MG/1
50 TABLET, FILM COATED ORAL DAILY
Status: CANCELLED | OUTPATIENT
Start: 2025-08-22

## 2025-08-22 RX ORDER — LISINOPRIL 30 MG/1
30 TABLET ORAL DAILY
Qty: 90 TABLET | Refills: 1 | Status: SHIPPED | OUTPATIENT
Start: 2025-08-22

## (undated) DEVICE — SOL NACL 0.9PCT 100ML SGL

## (undated) DEVICE — TRAP FLD MINIVAC MEGADYNE 100ML

## (undated) DEVICE — GLV SURG SENSICARE PI PF LF 7 GRN STRL

## (undated) DEVICE — GLV SURG PREMIERPRO ORTHO LTX PF SZ7.5 BRN

## (undated) DEVICE — SYS CLS SKIN PREMIERPRO EXOFINFUSION 22CM

## (undated) DEVICE — GLV SURG SENSICARE W/ALOE PF LF 8 STRL

## (undated) DEVICE — 3M™ IOBAN™ 2 ANTIMICROBIAL INCISE DRAPE 6650EZ: Brand: IOBAN™ 2

## (undated) DEVICE — TBG PENCL TELESCP MEGADYNE SMOKE EVAC 10FT

## (undated) DEVICE — SYR LUERLOK 30CC

## (undated) DEVICE — APPL DURAPREP IODOPHOR APL 26ML

## (undated) DEVICE — PK HIP TOTL 40

## (undated) DEVICE — SOL IRR NACL 0.9PCT 3000ML

## (undated) DEVICE — MAT FLR ABSORBENT LG 4FT 10 2.5FT

## (undated) DEVICE — PREMIUM WET SKIN PREP TRAY: Brand: MEDLINE INDUSTRIES, INC.

## (undated) DEVICE — GLV SURG SENSICARE PI MIC PF SZ7 LF STRL

## (undated) DEVICE — SUT PDS 1 CT1 36IN Z347H

## (undated) DEVICE — GLV SURG BIOGEL LTX PF 7 1/2

## (undated) DEVICE — 3M™ IOBAN™ 2 ANTIMICROBIAL INCISE DRAPE 6640EZ: Brand: IOBAN™ 2

## (undated) DEVICE — ANTIBACTERIAL UNDYED BRAIDED (POLYGLACTIN 910), SYNTHETIC ABSORBABLE SUTURE: Brand: COATED VICRYL

## (undated) DEVICE — PREP SOL POVIDONE/IODINE BT 4OZ

## (undated) DEVICE — HANDPIECE SET WITH COAXIAL HIGH FLOW TIP AND SUCTION TUBE: Brand: INTERPULSE

## (undated) DEVICE — SUT VIC 1 CT1 36IN J947H